# Patient Record
Sex: MALE | Race: BLACK OR AFRICAN AMERICAN | NOT HISPANIC OR LATINO | Employment: OTHER | URBAN - METROPOLITAN AREA
[De-identification: names, ages, dates, MRNs, and addresses within clinical notes are randomized per-mention and may not be internally consistent; named-entity substitution may affect disease eponyms.]

---

## 2023-01-25 ENCOUNTER — HOSPITAL ENCOUNTER (OUTPATIENT)
Dept: CARDIOLOGY | Facility: HOSPITAL | Age: 68
Discharge: HOME | End: 2023-01-25
Attending: FAMILY MEDICINE
Payer: MEDICARE

## 2023-01-25 ENCOUNTER — OFFICE VISIT (OUTPATIENT)
Dept: INTERNAL MEDICINE | Facility: CLINIC | Age: 68
End: 2023-01-25
Payer: MEDICARE

## 2023-01-25 ENCOUNTER — HOSPITAL ENCOUNTER (OUTPATIENT)
Dept: RADIOLOGY | Facility: HOSPITAL | Age: 68
Discharge: HOME | End: 2023-01-25
Attending: FAMILY MEDICINE
Payer: MEDICARE

## 2023-01-25 VITALS
BODY MASS INDEX: 23.52 KG/M2 | OXYGEN SATURATION: 98 % | DIASTOLIC BLOOD PRESSURE: 98 MMHG | WEIGHT: 141.13 LBS | HEIGHT: 65 IN | TEMPERATURE: 98 F | SYSTOLIC BLOOD PRESSURE: 204 MMHG | HEART RATE: 60 BPM

## 2023-01-25 DIAGNOSIS — Z01.00 ROUTINE EYE EXAM: ICD-10-CM

## 2023-01-25 DIAGNOSIS — Z12.2 ENCOUNTER FOR SCREENING FOR LUNG CANCER: ICD-10-CM

## 2023-01-25 DIAGNOSIS — I10 PRIMARY HYPERTENSION: Chronic | ICD-10-CM

## 2023-01-25 DIAGNOSIS — Z86.39 HISTORY OF METABOLIC AND NUTRITIONAL DISORDER: ICD-10-CM

## 2023-01-25 DIAGNOSIS — Z13.220 ENCOUNTER FOR LIPID SCREENING FOR CARDIOVASCULAR DISEASE: ICD-10-CM

## 2023-01-25 DIAGNOSIS — Z13.1 SCREENING FOR DIABETES MELLITUS: ICD-10-CM

## 2023-01-25 DIAGNOSIS — Z13.6 ENCOUNTER FOR LIPID SCREENING FOR CARDIOVASCULAR DISEASE: ICD-10-CM

## 2023-01-25 DIAGNOSIS — H91.13 PRESBYCUSIS OF BOTH EARS: ICD-10-CM

## 2023-01-25 DIAGNOSIS — Z87.891 FORMER SMOKER: ICD-10-CM

## 2023-01-25 DIAGNOSIS — H61.23 EXCESSIVE CERUMEN IN EAR CANAL, BILATERAL: ICD-10-CM

## 2023-01-25 DIAGNOSIS — I10 PRIMARY HYPERTENSION: Primary | Chronic | ICD-10-CM

## 2023-01-25 DIAGNOSIS — Z12.5 SCREENING PSA (PROSTATE SPECIFIC ANTIGEN): ICD-10-CM

## 2023-01-25 DIAGNOSIS — Z11.59 ENCOUNTER FOR HEPATITIS C SCREENING TEST FOR LOW RISK PATIENT: ICD-10-CM

## 2023-01-25 DIAGNOSIS — Z13.6 SCREENING FOR ABDOMINAL AORTIC ANEURYSM: ICD-10-CM

## 2023-01-25 PROCEDURE — 3008F BODY MASS INDEX DOCD: CPT | Mod: CPTII,S$GLB,, | Performed by: FAMILY MEDICINE

## 2023-01-25 PROCEDURE — 71045 XR CHEST 1 VIEW: ICD-10-PCS | Mod: 26,,, | Performed by: RADIOLOGY

## 2023-01-25 PROCEDURE — 3044F PR MOST RECENT HEMOGLOBIN A1C LEVEL <7.0%: ICD-10-PCS | Mod: CPTII,S$GLB,, | Performed by: FAMILY MEDICINE

## 2023-01-25 PROCEDURE — 1126F AMNT PAIN NOTED NONE PRSNT: CPT | Mod: CPTII,S$GLB,, | Performed by: FAMILY MEDICINE

## 2023-01-25 PROCEDURE — 3080F DIAST BP >= 90 MM HG: CPT | Mod: CPTII,S$GLB,, | Performed by: FAMILY MEDICINE

## 2023-01-25 PROCEDURE — 3077F SYST BP >= 140 MM HG: CPT | Mod: CPTII,S$GLB,, | Performed by: FAMILY MEDICINE

## 2023-01-25 PROCEDURE — 1101F PR PT FALLS ASSESS DOC 0-1 FALLS W/OUT INJ PAST YR: ICD-10-PCS | Mod: CPTII,S$GLB,, | Performed by: FAMILY MEDICINE

## 2023-01-25 PROCEDURE — 3080F PR MOST RECENT DIASTOLIC BLOOD PRESSURE >= 90 MM HG: ICD-10-PCS | Mod: CPTII,S$GLB,, | Performed by: FAMILY MEDICINE

## 2023-01-25 PROCEDURE — 3288F FALL RISK ASSESSMENT DOCD: CPT | Mod: CPTII,S$GLB,, | Performed by: FAMILY MEDICINE

## 2023-01-25 PROCEDURE — 1126F PR PAIN SEVERITY QUANTIFIED, NO PAIN PRESENT: ICD-10-PCS | Mod: CPTII,S$GLB,, | Performed by: FAMILY MEDICINE

## 2023-01-25 PROCEDURE — 3288F PR FALLS RISK ASSESSMENT DOCUMENTED: ICD-10-PCS | Mod: CPTII,S$GLB,, | Performed by: FAMILY MEDICINE

## 2023-01-25 PROCEDURE — 93010 EKG 12-LEAD: ICD-10-PCS | Mod: ,,, | Performed by: INTERNAL MEDICINE

## 2023-01-25 PROCEDURE — 71045 X-RAY EXAM CHEST 1 VIEW: CPT | Mod: TC

## 2023-01-25 PROCEDURE — 3044F HG A1C LEVEL LT 7.0%: CPT | Mod: CPTII,S$GLB,, | Performed by: FAMILY MEDICINE

## 2023-01-25 PROCEDURE — 99204 PR OFFICE/OUTPT VISIT, NEW, LEVL IV, 45-59 MIN: ICD-10-PCS | Mod: S$GLB,,, | Performed by: FAMILY MEDICINE

## 2023-01-25 PROCEDURE — 99999 PR PBB SHADOW E&M-NEW PATIENT-LVL V: CPT | Mod: PBBFAC,,, | Performed by: FAMILY MEDICINE

## 2023-01-25 PROCEDURE — 71045 X-RAY EXAM CHEST 1 VIEW: CPT | Mod: 26,,, | Performed by: RADIOLOGY

## 2023-01-25 PROCEDURE — 93010 ELECTROCARDIOGRAM REPORT: CPT | Mod: ,,, | Performed by: INTERNAL MEDICINE

## 2023-01-25 PROCEDURE — 93005 ELECTROCARDIOGRAM TRACING: CPT

## 2023-01-25 PROCEDURE — 3008F PR BODY MASS INDEX (BMI) DOCUMENTED: ICD-10-PCS | Mod: CPTII,S$GLB,, | Performed by: FAMILY MEDICINE

## 2023-01-25 PROCEDURE — 3077F PR MOST RECENT SYSTOLIC BLOOD PRESSURE >= 140 MM HG: ICD-10-PCS | Mod: CPTII,S$GLB,, | Performed by: FAMILY MEDICINE

## 2023-01-25 PROCEDURE — 99204 OFFICE O/P NEW MOD 45 MIN: CPT | Mod: S$GLB,,, | Performed by: FAMILY MEDICINE

## 2023-01-25 PROCEDURE — 99999 PR PBB SHADOW E&M-NEW PATIENT-LVL V: ICD-10-PCS | Mod: PBBFAC,,, | Performed by: FAMILY MEDICINE

## 2023-01-25 PROCEDURE — 1101F PT FALLS ASSESS-DOCD LE1/YR: CPT | Mod: CPTII,S$GLB,, | Performed by: FAMILY MEDICINE

## 2023-01-25 RX ORDER — VALSARTAN AND HYDROCHLOROTHIAZIDE 160; 12.5 MG/1; MG/1
1 TABLET, FILM COATED ORAL DAILY
Qty: 90 TABLET | Refills: 3 | Status: SHIPPED | OUTPATIENT
Start: 2023-01-25 | End: 2023-01-25 | Stop reason: SDUPTHER

## 2023-01-25 RX ORDER — VALSARTAN AND HYDROCHLOROTHIAZIDE 160; 12.5 MG/1; MG/1
1 TABLET, FILM COATED ORAL DAILY
Qty: 30 TABLET | Refills: 0 | Status: SHIPPED | OUTPATIENT
Start: 2023-01-25 | End: 2023-02-01 | Stop reason: DRUGHIGH

## 2023-01-25 NOTE — PROGRESS NOTES
OFFICE VISIT 1/25/23 11:00 AM CST    Subjective   CHIEF COMPLAINT: Establish Care    Hypertension is uncontrolled, but asymptomatic.      Review of Systems   Respiratory:  Negative for chest tightness and shortness of breath.    Cardiovascular:  Negative for chest pain.   Endocrine: Negative for polydipsia and polyuria.      Assessment & Plan   1. Primary hypertension  -     Comprehensive Metabolic Panel; Future; Expected date: 01/25/2023  -     Lipid Panel; Future; Expected date: 01/25/2023  -     US Abdominal Aorta; Future; Expected date: 01/25/2023  -     TSH; Future; Expected date: 01/25/2023  -     T4, Free; Future; Expected date: 01/25/2023  -     SCHEDULED EKG 12-LEAD (to Muse); Future  -     X-Ray Chest 1 View; Future; Expected date: 01/25/2023  -     Ambulatory referral/consult to Cardiology; Future; Expected date: 02/01/2023  -     Aldosterone/Renin Activity Ratio; Future; Expected date: 01/25/2023  -     Urinalysis; Future; Expected date: 01/25/2023  -     Rx: valsartan-hydrochlorothiazide (DIOVAN-HCT) 160-12.5 mg per tablet; Take 1 tablet by mouth once daily.  Dispense: 30 tablet; Refill: 0    2. Former smoker  Overview:  Quit 2021. 55 pack-year smoking history.    Orders:  -     CT Chest Lung Screening Low Dose; Future; Expected date: 01/25/2023  -     US Abdominal Aorta; Future; Expected date: 01/25/2023    3. Encounter for lipid screening for cardiovascular disease  -     Lipid Panel; Future; Expected date: 01/25/2023    4. Screening for diabetes mellitus  -     Hemoglobin A1C; Future; Expected date: 01/25/2023    5. Encounter for hepatitis C screening test for low risk patient  -     Hepatitis C Antibody; Future; Expected date: 01/25/2023    6. Screening PSA (prostate specific antigen)  -     PSA, Screening; Future; Expected date: 01/25/2023    7. Encounter for screening for lung cancer  -     CT Chest Lung Screening Low Dose; Future; Expected date: 01/25/2023    8. Excessive cerumen in ear canal,  "bilateral  -     Ambulatory referral/consult to ENT; Future; Expected date: 02/01/2023  -     Ambulatory referral/consult to Audiology; Future; Expected date: 02/01/2023    9. Presbycusis of both ears  -     Ambulatory referral/consult to ENT; Future; Expected date: 02/01/2023  -     Ambulatory referral/consult to Audiology; Future; Expected date: 02/01/2023    10. Routine eye exam  -     Ambulatory referral/consult to Ophthalmology; Future; Expected date: 02/01/2023    11. Screening for abdominal aortic aneurysm  -     US Abdominal Aorta; Future; Expected date: 01/25/2023    12. History of metabolic and nutritional disorder  -     Hemoglobin A1C; Future; Expected date: 01/25/2023  -     CBC Without Differential; Future; Expected date: 01/25/2023  -     Comprehensive Metabolic Panel; Future; Expected date: 01/25/2023    Unless noted herein, any chronic conditions are represented as and appear stable, and no other significant complaints or concerns were reported.    Follow up in about 1 week (around 2/1/2023) for periodic management of chronic medical conditions, re-evaluate problem(s) discussed today.     Objective   Vitals:    01/25/23 1036 01/25/23 1110 01/25/23 1111   BP: (!) 210/100 (!) 206/92 (!) 204/98   BP Location: Right arm Right arm Left arm   Patient Position: Sitting Sitting Sitting   BP Method: Large (Manual)     Pulse: 60     Temp: 97.7 °F (36.5 °C)     TempSrc: Tympanic     SpO2: 98%     Weight: 64 kg (141 lb 1.5 oz)     Height: 5' 5" (1.651 m)     Physical Exam  Vitals reviewed.   Constitutional:       General: He is not in acute distress.     Appearance: Normal appearance. He is not ill-appearing or diaphoretic.   Neck:      Vascular: No carotid bruit.   Cardiovascular:      Rate and Rhythm: Normal rate and regular rhythm.   Pulmonary:      Effort: Pulmonary effort is normal.      Breath sounds: Normal breath sounds.   Skin:     General: Skin is warm and dry.   Neurological:      Mental Status: He " "is alert and oriented to person, place, and time. Mental status is at baseline.   Psychiatric:         Mood and Affect: Mood normal.         Behavior: Behavior normal.         Judgment: Judgment normal.     Documentation entered by me for this encounter may have been done in part using speech-recognition technology. Although I have made an effort to ensure accuracy, "sound like" errors may exist and should be interpreted in context.    CHECK-OUT INSTRUCTIONS  Send today for labs, CXR, and EKG.  Schedule Cardiology appointment soon.  Schedule Ultrasound and CT scan within 2 months.  Schedule other appointments (ENT, ophthalmology, audiology) at his convenience.  Schedule appointment with Marcio Mcgrath NP or Damari Roca PA-C on Wednesday, February 1. (Meme Martin MA will be her that day to enroll her in digital medicine.)  Schedule next regularly available in-office appointment with me.  Send him to [facility] to  his prescription.    ITEMS REQUIRING FOLLOW-UP AT NEXT APPOINTMENT [facility]  Address lab and test results.  Adjust BP med PRN. Give him only 30-day supply.  Enroll in Hypertension Digital Medicine program.   I anticipate he will need to be started on a statin.  Ensure cardiology appointment scheduled.  Schedule a F/U appointment in 2 weeks to re-assess BP and verify participation in Hypertension Digital Medicine program.  "

## 2023-01-26 ENCOUNTER — TELEPHONE (OUTPATIENT)
Dept: OTOLARYNGOLOGY | Facility: CLINIC | Age: 68
End: 2023-01-26
Payer: MEDICARE

## 2023-02-01 ENCOUNTER — CLINICAL SUPPORT (OUTPATIENT)
Dept: AUDIOLOGY | Facility: CLINIC | Age: 68
End: 2023-02-01
Payer: MEDICARE

## 2023-02-01 ENCOUNTER — OFFICE VISIT (OUTPATIENT)
Dept: INTERNAL MEDICINE | Facility: CLINIC | Age: 68
End: 2023-02-01
Payer: MEDICARE

## 2023-02-01 ENCOUNTER — OFFICE VISIT (OUTPATIENT)
Dept: OTOLARYNGOLOGY | Facility: CLINIC | Age: 68
End: 2023-02-01
Payer: MEDICARE

## 2023-02-01 VITALS
TEMPERATURE: 98 F | DIASTOLIC BLOOD PRESSURE: 90 MMHG | BODY MASS INDEX: 23.44 KG/M2 | WEIGHT: 140.88 LBS | OXYGEN SATURATION: 96 % | HEART RATE: 70 BPM | SYSTOLIC BLOOD PRESSURE: 170 MMHG

## 2023-02-01 VITALS
SYSTOLIC BLOOD PRESSURE: 184 MMHG | DIASTOLIC BLOOD PRESSURE: 89 MMHG | WEIGHT: 140.44 LBS | TEMPERATURE: 98 F | BODY MASS INDEX: 23.37 KG/M2 | HEART RATE: 65 BPM

## 2023-02-01 DIAGNOSIS — H90.A32 MIXED CONDUCTIVE AND SENSORINEURAL HEARING LOSS OF LEFT EAR WITH RESTRICTED HEARING OF RIGHT EAR: ICD-10-CM

## 2023-02-01 DIAGNOSIS — Z85.46 HISTORY OF PROSTATE CANCER: ICD-10-CM

## 2023-02-01 DIAGNOSIS — I10 PRIMARY HYPERTENSION: ICD-10-CM

## 2023-02-01 DIAGNOSIS — H90.A21 SENSORINEURAL HEARING LOSS (SNHL) OF RIGHT EAR WITH RESTRICTED HEARING OF LEFT EAR: Primary | ICD-10-CM

## 2023-02-01 DIAGNOSIS — H61.21 RIGHT EAR IMPACTED CERUMEN: Primary | ICD-10-CM

## 2023-02-01 DIAGNOSIS — H61.23 EXCESSIVE CERUMEN IN EAR CANAL, BILATERAL: ICD-10-CM

## 2023-02-01 DIAGNOSIS — E78.2 MIXED HYPERLIPIDEMIA: Primary | ICD-10-CM

## 2023-02-01 PROCEDURE — 3288F FALL RISK ASSESSMENT DOCD: CPT | Mod: CPTII,S$GLB,, | Performed by: PHYSICIAN ASSISTANT

## 2023-02-01 PROCEDURE — 3008F BODY MASS INDEX DOCD: CPT | Mod: CPTII,S$GLB,, | Performed by: OTOLARYNGOLOGY

## 2023-02-01 PROCEDURE — 3044F PR MOST RECENT HEMOGLOBIN A1C LEVEL <7.0%: ICD-10-PCS | Mod: CPTII,S$GLB,, | Performed by: PHYSICIAN ASSISTANT

## 2023-02-01 PROCEDURE — 1159F PR MEDICATION LIST DOCUMENTED IN MEDICAL RECORD: ICD-10-PCS | Mod: CPTII,S$GLB,, | Performed by: OTOLARYNGOLOGY

## 2023-02-01 PROCEDURE — 3077F SYST BP >= 140 MM HG: CPT | Mod: CPTII,S$GLB,, | Performed by: PHYSICIAN ASSISTANT

## 2023-02-01 PROCEDURE — 99214 OFFICE O/P EST MOD 30 MIN: CPT | Mod: S$GLB,,, | Performed by: PHYSICIAN ASSISTANT

## 2023-02-01 PROCEDURE — 3044F HG A1C LEVEL LT 7.0%: CPT | Mod: CPTII,S$GLB,, | Performed by: PHYSICIAN ASSISTANT

## 2023-02-01 PROCEDURE — 3044F PR MOST RECENT HEMOGLOBIN A1C LEVEL <7.0%: ICD-10-PCS | Mod: CPTII,S$GLB,, | Performed by: OTOLARYNGOLOGY

## 2023-02-01 PROCEDURE — 1101F PT FALLS ASSESS-DOCD LE1/YR: CPT | Mod: CPTII,S$GLB,, | Performed by: OTOLARYNGOLOGY

## 2023-02-01 PROCEDURE — 3077F PR MOST RECENT SYSTOLIC BLOOD PRESSURE >= 140 MM HG: ICD-10-PCS | Mod: CPTII,S$GLB,, | Performed by: PHYSICIAN ASSISTANT

## 2023-02-01 PROCEDURE — 99499 NO LOS: ICD-10-PCS | Mod: S$GLB,,, | Performed by: OTOLARYNGOLOGY

## 2023-02-01 PROCEDURE — 3008F PR BODY MASS INDEX (BMI) DOCUMENTED: ICD-10-PCS | Mod: CPTII,S$GLB,, | Performed by: OTOLARYNGOLOGY

## 2023-02-01 PROCEDURE — 3288F PR FALLS RISK ASSESSMENT DOCUMENTED: ICD-10-PCS | Mod: CPTII,S$GLB,, | Performed by: OTOLARYNGOLOGY

## 2023-02-01 PROCEDURE — 3288F PR FALLS RISK ASSESSMENT DOCUMENTED: ICD-10-PCS | Mod: CPTII,S$GLB,, | Performed by: PHYSICIAN ASSISTANT

## 2023-02-01 PROCEDURE — 3079F PR MOST RECENT DIASTOLIC BLOOD PRESSURE 80-89 MM HG: ICD-10-PCS | Mod: CPTII,S$GLB,, | Performed by: OTOLARYNGOLOGY

## 2023-02-01 PROCEDURE — 1160F RVW MEDS BY RX/DR IN RCRD: CPT | Mod: CPTII,S$GLB,, | Performed by: PHYSICIAN ASSISTANT

## 2023-02-01 PROCEDURE — 99999 PR PBB SHADOW E&M-EST. PATIENT-LVL III: ICD-10-PCS | Mod: PBBFAC,,, | Performed by: PHYSICIAN ASSISTANT

## 2023-02-01 PROCEDURE — 3288F FALL RISK ASSESSMENT DOCD: CPT | Mod: CPTII,S$GLB,, | Performed by: OTOLARYNGOLOGY

## 2023-02-01 PROCEDURE — 3077F SYST BP >= 140 MM HG: CPT | Mod: CPTII,S$GLB,, | Performed by: OTOLARYNGOLOGY

## 2023-02-01 PROCEDURE — 1160F PR REVIEW ALL MEDS BY PRESCRIBER/CLIN PHARMACIST DOCUMENTED: ICD-10-PCS | Mod: CPTII,S$GLB,, | Performed by: PHYSICIAN ASSISTANT

## 2023-02-01 PROCEDURE — 1159F MED LIST DOCD IN RCRD: CPT | Mod: CPTII,S$GLB,, | Performed by: OTOLARYNGOLOGY

## 2023-02-01 PROCEDURE — 1101F PT FALLS ASSESS-DOCD LE1/YR: CPT | Mod: CPTII,S$GLB,, | Performed by: PHYSICIAN ASSISTANT

## 2023-02-01 PROCEDURE — 1126F PR PAIN SEVERITY QUANTIFIED, NO PAIN PRESENT: ICD-10-PCS | Mod: CPTII,S$GLB,, | Performed by: OTOLARYNGOLOGY

## 2023-02-01 PROCEDURE — 69210 PR REMOVAL IMPACTED CERUMEN REQUIRING INSTRUMENTATION, UNILATERAL: ICD-10-PCS | Mod: S$GLB,,, | Performed by: OTOLARYNGOLOGY

## 2023-02-01 PROCEDURE — 99999 PR PBB SHADOW E&M-EST. PATIENT-LVL III: ICD-10-PCS | Mod: PBBFAC,,, | Performed by: OTOLARYNGOLOGY

## 2023-02-01 PROCEDURE — 99999 PR PBB SHADOW E&M-EST. PATIENT-LVL II: ICD-10-PCS | Mod: PBBFAC,,, | Performed by: AUDIOLOGIST-HEARING AID FITTER

## 2023-02-01 PROCEDURE — 3080F DIAST BP >= 90 MM HG: CPT | Mod: CPTII,S$GLB,, | Performed by: PHYSICIAN ASSISTANT

## 2023-02-01 PROCEDURE — 69210 REMOVE IMPACTED EAR WAX UNI: CPT | Mod: S$GLB,,, | Performed by: OTOLARYNGOLOGY

## 2023-02-01 PROCEDURE — 1126F AMNT PAIN NOTED NONE PRSNT: CPT | Mod: CPTII,S$GLB,, | Performed by: PHYSICIAN ASSISTANT

## 2023-02-01 PROCEDURE — 3044F HG A1C LEVEL LT 7.0%: CPT | Mod: CPTII,S$GLB,, | Performed by: OTOLARYNGOLOGY

## 2023-02-01 PROCEDURE — 92557 PR COMPREHENSIVE HEARING TEST: ICD-10-PCS | Mod: S$GLB,,, | Performed by: AUDIOLOGIST-HEARING AID FITTER

## 2023-02-01 PROCEDURE — 99499 UNLISTED E&M SERVICE: CPT | Mod: S$GLB,,, | Performed by: OTOLARYNGOLOGY

## 2023-02-01 PROCEDURE — 3080F PR MOST RECENT DIASTOLIC BLOOD PRESSURE >= 90 MM HG: ICD-10-PCS | Mod: CPTII,S$GLB,, | Performed by: PHYSICIAN ASSISTANT

## 2023-02-01 PROCEDURE — 1159F PR MEDICATION LIST DOCUMENTED IN MEDICAL RECORD: ICD-10-PCS | Mod: CPTII,S$GLB,, | Performed by: PHYSICIAN ASSISTANT

## 2023-02-01 PROCEDURE — 1101F PR PT FALLS ASSESS DOC 0-1 FALLS W/OUT INJ PAST YR: ICD-10-PCS | Mod: CPTII,S$GLB,, | Performed by: PHYSICIAN ASSISTANT

## 2023-02-01 PROCEDURE — 3008F PR BODY MASS INDEX (BMI) DOCUMENTED: ICD-10-PCS | Mod: CPTII,S$GLB,, | Performed by: PHYSICIAN ASSISTANT

## 2023-02-01 PROCEDURE — 3079F DIAST BP 80-89 MM HG: CPT | Mod: CPTII,S$GLB,, | Performed by: OTOLARYNGOLOGY

## 2023-02-01 PROCEDURE — 99999 PR PBB SHADOW E&M-EST. PATIENT-LVL III: CPT | Mod: PBBFAC,,, | Performed by: OTOLARYNGOLOGY

## 2023-02-01 PROCEDURE — 99214 PR OFFICE/OUTPT VISIT, EST, LEVL IV, 30-39 MIN: ICD-10-PCS | Mod: S$GLB,,, | Performed by: PHYSICIAN ASSISTANT

## 2023-02-01 PROCEDURE — 1101F PR PT FALLS ASSESS DOC 0-1 FALLS W/OUT INJ PAST YR: ICD-10-PCS | Mod: CPTII,S$GLB,, | Performed by: OTOLARYNGOLOGY

## 2023-02-01 PROCEDURE — 99999 PR PBB SHADOW E&M-EST. PATIENT-LVL II: CPT | Mod: PBBFAC,,, | Performed by: AUDIOLOGIST-HEARING AID FITTER

## 2023-02-01 PROCEDURE — 3077F PR MOST RECENT SYSTOLIC BLOOD PRESSURE >= 140 MM HG: ICD-10-PCS | Mod: CPTII,S$GLB,, | Performed by: OTOLARYNGOLOGY

## 2023-02-01 PROCEDURE — 3008F BODY MASS INDEX DOCD: CPT | Mod: CPTII,S$GLB,, | Performed by: PHYSICIAN ASSISTANT

## 2023-02-01 PROCEDURE — 99999 PR PBB SHADOW E&M-EST. PATIENT-LVL III: CPT | Mod: PBBFAC,,, | Performed by: PHYSICIAN ASSISTANT

## 2023-02-01 PROCEDURE — 1159F MED LIST DOCD IN RCRD: CPT | Mod: CPTII,S$GLB,, | Performed by: PHYSICIAN ASSISTANT

## 2023-02-01 PROCEDURE — 92557 COMPREHENSIVE HEARING TEST: CPT | Mod: S$GLB,,, | Performed by: AUDIOLOGIST-HEARING AID FITTER

## 2023-02-01 PROCEDURE — 1126F AMNT PAIN NOTED NONE PRSNT: CPT | Mod: CPTII,S$GLB,, | Performed by: OTOLARYNGOLOGY

## 2023-02-01 PROCEDURE — 1126F PR PAIN SEVERITY QUANTIFIED, NO PAIN PRESENT: ICD-10-PCS | Mod: CPTII,S$GLB,, | Performed by: PHYSICIAN ASSISTANT

## 2023-02-01 RX ORDER — VALSARTAN AND HYDROCHLOROTHIAZIDE 320; 25 MG/1; MG/1
1 TABLET, FILM COATED ORAL DAILY
Qty: 90 TABLET | Refills: 3 | Status: SHIPPED | OUTPATIENT
Start: 2023-02-01 | End: 2023-03-08 | Stop reason: SDUPTHER

## 2023-02-01 RX ORDER — ATORVASTATIN CALCIUM 80 MG/1
80 TABLET, FILM COATED ORAL DAILY
Qty: 90 TABLET | Refills: 3 | Status: SHIPPED | OUTPATIENT
Start: 2023-02-01 | End: 2024-02-14 | Stop reason: SDUPTHER

## 2023-02-01 NOTE — PROGRESS NOTES
REFERRING PROVIDER  Aaareferral Self  No address on file  Subjective:   Patient: Musa Campos [id number], :1955   Visit date:2023 1:30 PM    Chief Complaint:  ear cleaning (Pt states decreased hearing left ear. Pt states every time it gets cold he cannot hear out of left ear)        HPI:     Musa Campos is a 67 y.o. male whom I am asked to see for evaluation of otalgia or hearing loss in the right ear for the past 1-4 weeks.   Musa rates the severity as moderate.  No exacerbating or relieving factors.  There is no drainage from the ears.      His meds, allergies, medical, surgical, social & family histories were reviewed & updated:  -     He has a current medication list which includes the following prescription(s): atorvastatin and valsartan-hydrochlorothiazide.  -     He  has a past medical history of Primary hypertension (2023).   -     He does not have any pertinent problems on file.   -     He  has a past surgical history that includes Prostate surgery.  -     He  reports that he quit smoking about 2 years ago. His smoking use included cigarettes. He started smoking about 57 years ago. He has a 55.00 pack-year smoking history. He does not have any smokeless tobacco history on file. He reports that he does not currently use alcohol. He reports current drug use. Drug: Marijuana.  -     His family history includes No Known Problems in his father and mother.  -     He has No Known Allergies.      Review of Systems:  -     Allergic/Immunologic: has No Known Allergies..  -     Constitutional: Current temp: 97.9 °F (36.6 °C) (Temporal)        Objective:     Physical Exam:  Vitals:  BP (!) 184/89   Pulse 65   Temp 97.9 °F (36.6 °C) (Temporal)   Wt 63.7 kg (140 lb 6.9 oz)   BMI 23.37 kg/m²   Communication:  Able to communicate, no hoarseness.  Head & Face:  Normocephalic, atraumatic, no sinus tenderness.  Eyes:  Extraocular motions intact.  Ears:  Otoscopy of external auditory canals  reveals impaction of right ear canals.  With the patient in the supine position, we used the operating microscope to examine both ears with the appropriate sized ear speculum.  A variety of sterile, micro-instruments were utilized to remove the cerumen atraumatically from the impacted ear(s).   After removal, the ears were reexamined-  Right Ear:  No mass/lesion of auricle. The external auditory canals is without erythema or discharge. Pneumatic otoscopy of the tympanic membrane revealed no perforation and good mobility, with no fluid in middle ear. Clinical speech reception thresholds grossly normal.  Left Ear:  No mass/lesion of auricle. The external auditory canals is without erythema or discharge. Pneumatic otoscopy of the tympanic membrane revealed no perforation and good mobility, with no fluid in middle ear. Clinical speech reception thresholds grossly normal  Nose:  No masses/lesions of external nose, nasal mucosa, septum, and turbinates were within normal limits.  Mouth:  No mass/lesion of lips, teeth, gums, hard/soft palate, tongue, tonsils, or oropharynx.  Neck & Lymphatics:  No cervical lymphadenopathy, no neck mass/crepitus/ asymmetry, trachea is midline, no thyroid enlargement/tenderness/mass.  Neuro/Psych: Alert with normal mood and affect.   Respiration/Chest:  Symmetric expansion during respiration, normal respiratory effort.  Skin:  Warm and intact.    Assessment & Plan:       -     Cerumen Impaction - Musa has cerumen impaction.  We discussed preventative measures and treatment options.  Q-tips must be avoided, instead the ears can be cleaned with OTC ear rinses (or a mixture of alcohol & vinegar in equal parts).   For hard wax, Musa may place mineral oil/baby oil in the ear with a cotton ball at night and remove in the shower.  This will assist in softening the wax and allow it to drain out on its own. If the cerumen impacts the ear canal and causes hearing loss or infection he needs to  follow-up in the clinic for treatment and cleaning.               negative...

## 2023-02-01 NOTE — PROGRESS NOTES
Referring Provider:Dr. Tomi Vigil [facility] was seen 02/01/2023 for an audiological evaluation.  Patient complains of otalgia or hearing loss in the right ear for the past 1-4 weeks.   Musa rates the severity as moderate.  No exacerbating or relieving factors.  There is no drainage from the ears. He reports longstanding hearing loss of the left ear that is worse when it is cold outside.    Results reveal a mild-to-moderate sensorineural hearing loss 250-8000 Hz for the right ear, and a moderate-to-severe mixed hearing loss 250-8000 Hz for the left ear.   Speech Reception Thresholds were  40 dBHL for the right ear and 70 dBHL for the left ear.   Word recognition scores were excellent for the right ear and excellent for the left ear.   Tympanograms were unable to obtain, Ear hair for the right ear and unable to obtain, Ear hair for the left ear.    Patient was counseled on the above findings.    Recommendations:  1. ENT  2. Annual Audiograms  3. Hearing aids via insurance when motivated.

## 2023-02-03 ENCOUNTER — HOSPITAL ENCOUNTER (OUTPATIENT)
Dept: RADIOLOGY | Facility: HOSPITAL | Age: 68
Discharge: HOME | End: 2023-02-03
Attending: FAMILY MEDICINE
Payer: MEDICARE

## 2023-02-03 DIAGNOSIS — Z87.891 FORMER SMOKER: ICD-10-CM

## 2023-02-03 DIAGNOSIS — Z12.2 ENCOUNTER FOR SCREENING FOR LUNG CANCER: ICD-10-CM

## 2023-02-03 DIAGNOSIS — I10 PRIMARY HYPERTENSION: ICD-10-CM

## 2023-02-03 DIAGNOSIS — Z13.6 SCREENING FOR ABDOMINAL AORTIC ANEURYSM: ICD-10-CM

## 2023-02-03 PROCEDURE — 76775 US EXAM ABDO BACK WALL LIM: CPT | Mod: 26,,, | Performed by: RADIOLOGY

## 2023-02-03 PROCEDURE — 71271 CT THORAX LUNG CANCER SCR C-: CPT | Mod: 26,,, | Performed by: RADIOLOGY

## 2023-02-03 PROCEDURE — 76775 US ABDOMINAL AORTA: ICD-10-PCS | Mod: 26,,, | Performed by: RADIOLOGY

## 2023-02-03 PROCEDURE — 76775 US EXAM ABDO BACK WALL LIM: CPT | Mod: TC

## 2023-02-03 PROCEDURE — 71271 CT THORAX LUNG CANCER SCR C-: CPT | Mod: TC

## 2023-02-03 PROCEDURE — 71271 CT CHEST LUNG SCREENING LOW DOSE: ICD-10-PCS | Mod: 26,,, | Performed by: RADIOLOGY

## 2023-02-04 ENCOUNTER — PATIENT MESSAGE (OUTPATIENT)
Dept: INTERNAL MEDICINE | Facility: CLINIC | Age: 68
End: 2023-02-04
Payer: MEDICARE

## 2023-02-04 DIAGNOSIS — Z87.891 FORMER SMOKER: Primary | ICD-10-CM

## 2023-02-04 DIAGNOSIS — Z12.2 ENCOUNTER FOR SCREENING FOR LUNG CANCER: ICD-10-CM

## 2023-02-04 DIAGNOSIS — R91.8 ABNORMAL SCREENING COMPUTED TOMOGRAPHY (CT) OF LUNG: ICD-10-CM

## 2023-02-04 PROBLEM — R77.9 ELEVATED SERUM PROTEIN LEVEL: Status: ACTIVE | Noted: 2023-02-04

## 2023-02-04 PROBLEM — E78.2 MIXED HYPERLIPIDEMIA: Chronic | Status: ACTIVE | Noted: 2023-02-04

## 2023-02-04 PROBLEM — D64.9 MILD ANEMIA: Chronic | Status: ACTIVE | Noted: 2023-02-04

## 2023-02-04 NOTE — PROGRESS NOTES
Musa Merrill.    I am proud of you for getting your lung cancer screening done!    The radiologist interpreted your test result findings as LUNG-RADS 3 (PROBABLY BENIGN). The findings in this category have a very high chance (greater than 98%) of being benign (NOT cancer).    But since it's not proven to be benign, it's helpful to see if the area in question changes over time. You will need follow-up with a repeat lung CT scan in 6 months. This approach helps avoid unnecessary biopsies, but if the area does change over time, it still allows for early diagnosis.    I've ordered your repeat lung CT scan. Please take the time now to schedule your CT scan in early August and appointment with me a few days later to review the test results.    You can schedule your CT scan by calling our appointment desk at [phone]. You can schedule your appointment with me from your [facility] account or from the [email or web address] shay on your smartphone or by calling our appointment desk at [phone]. If you have any difficulty, send my staff a message, and they will be glad to help.     Thanks for letting me care for you, and thanks for trusting [facility] with your healthcare needs.    Sincerely,    THIERNO Krishna MD

## 2023-02-05 ENCOUNTER — PATIENT MESSAGE (OUTPATIENT)
Dept: ADMINISTRATIVE | Facility: HOSPITAL | Age: 68
End: 2023-02-05
Payer: MEDICARE

## 2023-02-08 ENCOUNTER — PROCEDURE VISIT (OUTPATIENT)
Dept: OTOLARYNGOLOGY | Facility: CLINIC | Age: 68
End: 2023-02-08
Payer: MEDICARE

## 2023-02-08 DIAGNOSIS — H65.32 CHRONIC MUCOID OTITIS MEDIA OF LEFT EAR: Primary | ICD-10-CM

## 2023-02-08 DIAGNOSIS — H69.92 DYSFUNCTION OF LEFT EUSTACHIAN TUBE: ICD-10-CM

## 2023-02-08 PROCEDURE — 31231 PR NASAL ENDOSCOPY, DX: ICD-10-PCS | Mod: 51,S$GLB,, | Performed by: OTOLARYNGOLOGY

## 2023-02-08 PROCEDURE — 69433 CREATE EARDRUM OPENING: CPT | Mod: LT,S$GLB,, | Performed by: OTOLARYNGOLOGY

## 2023-02-08 PROCEDURE — 31231 NASAL ENDOSCOPY DX: CPT | Mod: 51,S$GLB,, | Performed by: OTOLARYNGOLOGY

## 2023-02-08 PROCEDURE — 69433 PR CREATE EARDRUM OPENING,LOCAL ANESTH: ICD-10-PCS | Mod: LT,S$GLB,, | Performed by: OTOLARYNGOLOGY

## 2023-02-08 RX ORDER — OFLOXACIN 3 MG/ML
3 SOLUTION AURICULAR (OTIC) 2 TIMES DAILY
Qty: 5 ML | Refills: 0 | Status: SHIPPED | OUTPATIENT
Start: 2023-02-08 | End: 2023-02-13

## 2023-02-08 NOTE — PROCEDURES
Procedures    Preprocedure Diagnosis:  Chronic left otitis media with effusion, ETD  Postoprocedure Diagnosis:  Same  Procedure:  Left ear tube placement    Findings:  Left ear tympanic membrane serous effusion, right ear tympanic membrane  normal    Procedure in detail:  After appropriate consents were obtained, the patient was placed on the exam chair in a supine position.  The binocular operating microscope was then brought into the field.    His left EAC was found to have a small amount of cerumen that was carefully cleaned with a curette.  The tympanic membrane was then visualized, and was found to be serous effusion.  A small drop of phenol was then applied to the tympanic membrane in the area of anticipated myringotomy.   A radial myringotomy was then made in the anterior-inferior quadrant of the tympanic membrane, and a #5 Feliciano tip suction was used to clear the middle ear.  With an alligator forceps, an Villanueva beveled grommet tube was then placed into the myringotomy site without difficulty.     The patient tolerated the procedure without difficulty, and there were no complications.    PROCEDURE NOTE:  Diagnostic nasal endoscopy  Preprocedure diagnosis:  Unilateral middle ear effusion  Postprocedure diangosis:  Same  Complications:  None  Blood Loss:  None    Procedure in detail:  After verbal consent was obtained, the patient's nasal cavity was anesthesized using topical Tetracaine and Neosynepherine.  A rigid 30 degree endoscope was placed in first the right, then the left nasal cavity.  The inferior and middle turbinates were examined, and found to be normal bilaterally.  The middle meatus and maxillary antrum was also examined, and found to be normal bilaterally.  No purulent drainage or masses seen.  The patient tolerated the procedure well and there were no complications.       Media Information  File Link    Annotation on 2/1/2023  2:08 PM by TREVOR BarbaD: AUDIOGRAM        [facility]quez  Information    Document ID File Type Document Type Description   [id number] Annotation Annotation AUDIOGRAM     Import Information    Attached At Date Time User Dept   Encounter Level 2/1/2023  2:08 PM KRISTOPHER Barba [facility] Audiology     Encounter    Clinical Support on 2/1/23 with KRISTOPHER Barba

## 2023-03-08 ENCOUNTER — OFFICE VISIT (OUTPATIENT)
Dept: UROLOGY | Facility: CLINIC | Age: 68
End: 2023-03-08
Payer: MEDICARE

## 2023-03-08 ENCOUNTER — OFFICE VISIT (OUTPATIENT)
Dept: CARDIOLOGY | Facility: CLINIC | Age: 68
End: 2023-03-08
Payer: MEDICARE

## 2023-03-08 VITALS
TEMPERATURE: 98 F | DIASTOLIC BLOOD PRESSURE: 75 MMHG | SYSTOLIC BLOOD PRESSURE: 183 MMHG | WEIGHT: 145.31 LBS | HEART RATE: 59 BPM | HEIGHT: 65 IN | BODY MASS INDEX: 24.21 KG/M2

## 2023-03-08 VITALS
OXYGEN SATURATION: 95 % | HEART RATE: 57 BPM | SYSTOLIC BLOOD PRESSURE: 180 MMHG | WEIGHT: 145.06 LBS | BODY MASS INDEX: 24.14 KG/M2 | DIASTOLIC BLOOD PRESSURE: 74 MMHG

## 2023-03-08 DIAGNOSIS — R01.1 UNDIAGNOSED CARDIAC MURMURS: ICD-10-CM

## 2023-03-08 DIAGNOSIS — I10 PRIMARY HYPERTENSION: Primary | Chronic | ICD-10-CM

## 2023-03-08 DIAGNOSIS — E78.2 MIXED HYPERLIPIDEMIA: Chronic | ICD-10-CM

## 2023-03-08 DIAGNOSIS — D64.9 MILD ANEMIA: Chronic | ICD-10-CM

## 2023-03-08 DIAGNOSIS — Z85.46 HISTORY OF PROSTATE CANCER: ICD-10-CM

## 2023-03-08 DIAGNOSIS — J45.909 ASTHMA, UNSPECIFIED ASTHMA SEVERITY, UNSPECIFIED WHETHER COMPLICATED, UNSPECIFIED WHETHER PERSISTENT: ICD-10-CM

## 2023-03-08 DIAGNOSIS — Z87.891 FORMER SMOKER: ICD-10-CM

## 2023-03-08 PROCEDURE — 1159F MED LIST DOCD IN RCRD: CPT | Mod: CPTII,S$GLB,, | Performed by: INTERNAL MEDICINE

## 2023-03-08 PROCEDURE — 3077F PR MOST RECENT SYSTOLIC BLOOD PRESSURE >= 140 MM HG: ICD-10-PCS | Mod: CPTII,S$GLB,, | Performed by: INTERNAL MEDICINE

## 2023-03-08 PROCEDURE — 1159F MED LIST DOCD IN RCRD: CPT | Mod: CPTII,S$GLB,, | Performed by: UROLOGY

## 2023-03-08 PROCEDURE — 3078F DIAST BP <80 MM HG: CPT | Mod: CPTII,S$GLB,, | Performed by: INTERNAL MEDICINE

## 2023-03-08 PROCEDURE — 3008F PR BODY MASS INDEX (BMI) DOCUMENTED: ICD-10-PCS | Mod: CPTII,S$GLB,, | Performed by: INTERNAL MEDICINE

## 2023-03-08 PROCEDURE — 99999 PR PBB SHADOW E&M-EST. PATIENT-LVL IV: CPT | Mod: PBBFAC,,, | Performed by: INTERNAL MEDICINE

## 2023-03-08 PROCEDURE — 1126F AMNT PAIN NOTED NONE PRSNT: CPT | Mod: CPTII,S$GLB,, | Performed by: INTERNAL MEDICINE

## 2023-03-08 PROCEDURE — 99999 PR PBB SHADOW E&M-EST. PATIENT-LVL III: ICD-10-PCS | Mod: PBBFAC,,, | Performed by: UROLOGY

## 2023-03-08 PROCEDURE — 99204 OFFICE O/P NEW MOD 45 MIN: CPT | Mod: S$GLB,,, | Performed by: UROLOGY

## 2023-03-08 PROCEDURE — 1159F PR MEDICATION LIST DOCUMENTED IN MEDICAL RECORD: ICD-10-PCS | Mod: CPTII,S$GLB,, | Performed by: UROLOGY

## 2023-03-08 PROCEDURE — 3077F PR MOST RECENT SYSTOLIC BLOOD PRESSURE >= 140 MM HG: ICD-10-PCS | Mod: CPTII,S$GLB,, | Performed by: UROLOGY

## 2023-03-08 PROCEDURE — 3288F FALL RISK ASSESSMENT DOCD: CPT | Mod: CPTII,S$GLB,, | Performed by: UROLOGY

## 2023-03-08 PROCEDURE — 1101F PT FALLS ASSESS-DOCD LE1/YR: CPT | Mod: CPTII,S$GLB,, | Performed by: UROLOGY

## 2023-03-08 PROCEDURE — 99204 OFFICE O/P NEW MOD 45 MIN: CPT | Mod: S$GLB,,, | Performed by: INTERNAL MEDICINE

## 2023-03-08 PROCEDURE — 1159F PR MEDICATION LIST DOCUMENTED IN MEDICAL RECORD: ICD-10-PCS | Mod: CPTII,S$GLB,, | Performed by: INTERNAL MEDICINE

## 2023-03-08 PROCEDURE — 1160F PR REVIEW ALL MEDS BY PRESCRIBER/CLIN PHARMACIST DOCUMENTED: ICD-10-PCS | Mod: CPTII,S$GLB,, | Performed by: UROLOGY

## 2023-03-08 PROCEDURE — 3008F BODY MASS INDEX DOCD: CPT | Mod: CPTII,S$GLB,, | Performed by: UROLOGY

## 2023-03-08 PROCEDURE — 1101F PR PT FALLS ASSESS DOC 0-1 FALLS W/OUT INJ PAST YR: ICD-10-PCS | Mod: CPTII,S$GLB,, | Performed by: INTERNAL MEDICINE

## 2023-03-08 PROCEDURE — 1126F AMNT PAIN NOTED NONE PRSNT: CPT | Mod: CPTII,S$GLB,, | Performed by: UROLOGY

## 2023-03-08 PROCEDURE — 1101F PR PT FALLS ASSESS DOC 0-1 FALLS W/OUT INJ PAST YR: ICD-10-PCS | Mod: CPTII,S$GLB,, | Performed by: UROLOGY

## 2023-03-08 PROCEDURE — 3078F PR MOST RECENT DIASTOLIC BLOOD PRESSURE < 80 MM HG: ICD-10-PCS | Mod: CPTII,S$GLB,, | Performed by: INTERNAL MEDICINE

## 2023-03-08 PROCEDURE — 3077F SYST BP >= 140 MM HG: CPT | Mod: CPTII,S$GLB,, | Performed by: UROLOGY

## 2023-03-08 PROCEDURE — 3044F HG A1C LEVEL LT 7.0%: CPT | Mod: CPTII,S$GLB,, | Performed by: UROLOGY

## 2023-03-08 PROCEDURE — 3044F PR MOST RECENT HEMOGLOBIN A1C LEVEL <7.0%: ICD-10-PCS | Mod: CPTII,S$GLB,, | Performed by: INTERNAL MEDICINE

## 2023-03-08 PROCEDURE — 1126F PR PAIN SEVERITY QUANTIFIED, NO PAIN PRESENT: ICD-10-PCS | Mod: CPTII,S$GLB,, | Performed by: UROLOGY

## 2023-03-08 PROCEDURE — 99204 PR OFFICE/OUTPT VISIT, NEW, LEVL IV, 45-59 MIN: ICD-10-PCS | Mod: S$GLB,,, | Performed by: INTERNAL MEDICINE

## 2023-03-08 PROCEDURE — 3078F DIAST BP <80 MM HG: CPT | Mod: CPTII,S$GLB,, | Performed by: UROLOGY

## 2023-03-08 PROCEDURE — 1160F RVW MEDS BY RX/DR IN RCRD: CPT | Mod: CPTII,S$GLB,, | Performed by: INTERNAL MEDICINE

## 2023-03-08 PROCEDURE — 3288F FALL RISK ASSESSMENT DOCD: CPT | Mod: CPTII,S$GLB,, | Performed by: INTERNAL MEDICINE

## 2023-03-08 PROCEDURE — 99999 PR PBB SHADOW E&M-EST. PATIENT-LVL IV: ICD-10-PCS | Mod: PBBFAC,,, | Performed by: INTERNAL MEDICINE

## 2023-03-08 PROCEDURE — 99204 PR OFFICE/OUTPT VISIT, NEW, LEVL IV, 45-59 MIN: ICD-10-PCS | Mod: S$GLB,,, | Performed by: UROLOGY

## 2023-03-08 PROCEDURE — 1160F RVW MEDS BY RX/DR IN RCRD: CPT | Mod: CPTII,S$GLB,, | Performed by: UROLOGY

## 2023-03-08 PROCEDURE — 99999 PR PBB SHADOW E&M-EST. PATIENT-LVL III: CPT | Mod: PBBFAC,,, | Performed by: UROLOGY

## 2023-03-08 PROCEDURE — 1126F PR PAIN SEVERITY QUANTIFIED, NO PAIN PRESENT: ICD-10-PCS | Mod: CPTII,S$GLB,, | Performed by: INTERNAL MEDICINE

## 2023-03-08 PROCEDURE — 1160F PR REVIEW ALL MEDS BY PRESCRIBER/CLIN PHARMACIST DOCUMENTED: ICD-10-PCS | Mod: CPTII,S$GLB,, | Performed by: INTERNAL MEDICINE

## 2023-03-08 PROCEDURE — 3044F HG A1C LEVEL LT 7.0%: CPT | Mod: CPTII,S$GLB,, | Performed by: INTERNAL MEDICINE

## 2023-03-08 PROCEDURE — 3288F PR FALLS RISK ASSESSMENT DOCUMENTED: ICD-10-PCS | Mod: CPTII,S$GLB,, | Performed by: UROLOGY

## 2023-03-08 PROCEDURE — 3288F PR FALLS RISK ASSESSMENT DOCUMENTED: ICD-10-PCS | Mod: CPTII,S$GLB,, | Performed by: INTERNAL MEDICINE

## 2023-03-08 PROCEDURE — 3077F SYST BP >= 140 MM HG: CPT | Mod: CPTII,S$GLB,, | Performed by: INTERNAL MEDICINE

## 2023-03-08 PROCEDURE — 3008F PR BODY MASS INDEX (BMI) DOCUMENTED: ICD-10-PCS | Mod: CPTII,S$GLB,, | Performed by: UROLOGY

## 2023-03-08 PROCEDURE — 1101F PT FALLS ASSESS-DOCD LE1/YR: CPT | Mod: CPTII,S$GLB,, | Performed by: INTERNAL MEDICINE

## 2023-03-08 PROCEDURE — 3078F PR MOST RECENT DIASTOLIC BLOOD PRESSURE < 80 MM HG: ICD-10-PCS | Mod: CPTII,S$GLB,, | Performed by: UROLOGY

## 2023-03-08 PROCEDURE — 3044F PR MOST RECENT HEMOGLOBIN A1C LEVEL <7.0%: ICD-10-PCS | Mod: CPTII,S$GLB,, | Performed by: UROLOGY

## 2023-03-08 PROCEDURE — 3008F BODY MASS INDEX DOCD: CPT | Mod: CPTII,S$GLB,, | Performed by: INTERNAL MEDICINE

## 2023-03-08 RX ORDER — AMLODIPINE BESYLATE 5 MG/1
5 TABLET ORAL DAILY
Qty: 90 TABLET | Refills: 1 | Status: SHIPPED | OUTPATIENT
Start: 2023-03-08 | End: 2023-10-30 | Stop reason: SDUPTHER

## 2023-03-08 RX ORDER — VALSARTAN AND HYDROCHLOROTHIAZIDE 320; 25 MG/1; MG/1
1 TABLET, FILM COATED ORAL DAILY
Qty: 90 TABLET | Refills: 1 | Status: SHIPPED | OUTPATIENT
Start: 2023-03-08 | End: 2023-10-30 | Stop reason: SDUPTHER

## 2023-03-08 NOTE — PROGRESS NOTES
Subjective:   Patient ID:  Musa Campos is a 67 y.o. male who presents for cardiac consult of No chief complaint on file.    Referring Physician:   KONSTANTIN Krishna MD [[id number]] [id number] [facility] [place] [address]     Reason for consult: HTN      HPI  The patient came in today for cardiac consult of No chief complaint on file.    3/8/23  Musa Campos is a 67 y.o. male pt with HTN, HLD, anemia, former smoker, h/o prostate CA presents for CV eval of HTN.     Recent BP elevated 180s/70s. Today 180/74. HR 57. BMI 24 - 145 lbs. He ran out of BP meds 3 days ago, he did not realize meds were sent to [facility].     Patient feels  no chest pain, no sob, no leg swelling, no PND, no palpitation, no dizziness, no syncope, no CNS symptoms.    Patient has fairly good exercise tolerance.    Patient is compliant with medications.    Past Medical History:   Diagnosis Date    Primary hypertension 2023       Past Surgical History:   Procedure Laterality Date    PROSTATE SURGERY         Social History     Tobacco Use    Smoking status: Former     Packs/day: 1.00     Years: 55.00     Pack years: 55.00     Types: Cigarettes     Start date:      Quit date:      Years since quittin.1    Smokeless tobacco: Former    Tobacco comments:     Pt quit smoking about 2 years ago.   Substance Use Topics    Alcohol use: Not Currently    Drug use: Yes     Types: Marijuana       Family History   Problem Relation Age of Onset    No Known Problems Mother     No Known Problems Father        Patient's Medications   New Prescriptions    AMLODIPINE (NORVASC) 5 MG TABLET    Take 1 tablet (5 mg total) by mouth once daily.   Previous Medications    ATORVASTATIN (LIPITOR) 80 MG TABLET    Take 1 tablet (80 mg total) by mouth once daily.   Modified Medications    Modified Medication Previous Medication    VALSARTAN-HYDROCHLOROTHIAZIDE (DIOVAN-HCT) 320-25 MG PER TABLET valsartan-hydrochlorothiazide (DIOVAN-HCT) 320-25  mg per tablet       Take 1 tablet by mouth once daily.    Take 1 tablet by mouth once daily.   Discontinued Medications    No medications on file       Review of Systems   Constitutional: Negative.    HENT: Negative.     Eyes: Negative.    Respiratory: Negative.     Cardiovascular: Negative.    Gastrointestinal: Negative.    Genitourinary: Negative.    Musculoskeletal: Negative.    Skin: Negative.    Neurological: Negative.    Endo/Heme/Allergies: Negative.    Psychiatric/Behavioral: Negative.     All 12 systems otherwise negative.    Wt Readings from Last 3 Encounters:   03/08/23 65.8 kg (145 lb 1 oz)   03/08/23 65.9 kg (145 lb 4.5 oz)   02/01/23 63.7 kg (140 lb 6.9 oz)     Temp Readings from Last 3 Encounters:   03/08/23 98.2 °F (36.8 °C) (Oral)   02/01/23 97.9 °F (36.6 °C) (Temporal)   02/01/23 98.2 °F (36.8 °C) (Oral)     BP Readings from Last 3 Encounters:   03/08/23 (!) 180/74   03/08/23 (!) 183/75   02/01/23 (!) 184/89     Pulse Readings from Last 3 Encounters:   03/08/23 (!) 57   03/08/23 (!) 59   02/01/23 65       BP (!) 180/74   Pulse (!) 57   Wt 65.8 kg (145 lb 1 oz)   SpO2 95%   BMI 24.14 kg/m²     Objective:   Physical Exam  Vitals and nursing note reviewed.   Constitutional:       General: He is not in acute distress.     Appearance: He is well-developed. He is not diaphoretic.   HENT:      Head: Normocephalic and atraumatic.      Nose: Nose normal.   Eyes:      General: No scleral icterus.     Conjunctiva/sclera: Conjunctivae normal.   Neck:      Thyroid: No thyromegaly.      Vascular: No JVD.   Cardiovascular:      Rate and Rhythm: Normal rate and regular rhythm.      Heart sounds: S1 normal and S2 normal. Murmur heard.     No friction rub. No gallop. No S3 or S4 sounds.   Pulmonary:      Effort: Pulmonary effort is normal. No respiratory distress.      Breath sounds: No stridor. Wheezing present. No rales.   Chest:      Chest wall: No tenderness.   Abdominal:      General: Bowel sounds are  normal. There is no distension.      Palpations: Abdomen is soft. There is no mass.      Tenderness: There is no abdominal tenderness. There is no rebound.   Genitourinary:     Comments: Deferred  Musculoskeletal:         General: No tenderness or deformity. Normal range of motion.      Cervical back: Normal range of motion and neck supple.   Lymphadenopathy:      Cervical: No cervical adenopathy.   Skin:     General: Skin is warm and dry.      Coloration: Skin is not pale.      Findings: No erythema or rash.   Neurological:      Mental Status: He is alert and oriented to person, place, and time.      Motor: No abnormal muscle tone.      Coordination: Coordination normal.   Psychiatric:         Behavior: Behavior normal.         Thought Content: Thought content normal.         Judgment: Judgment normal.       Lab Results   Component Value Date     01/25/2023    K 4.9 01/25/2023     01/25/2023    CO2 29 01/25/2023    BUN 11 01/25/2023    CREATININE 0.8 01/25/2023    GLU 80 01/25/2023    HGBA1C 5.6 01/25/2023    AST 16 01/25/2023    ALT 17 01/25/2023    ALBUMIN 3.5 01/25/2023    PROT 8.8 (H) 01/25/2023    BILITOT 0.5 01/25/2023    WBC 7.66 01/25/2023    HGB 12.6 (L) 01/25/2023    HCT 42.2 01/25/2023    MCV 90 01/25/2023     01/25/2023    TSH 2.094 01/25/2023    CHOL 263 (H) 01/25/2023    HDL 55 01/25/2023    LDLCALC 166.0 (H) 01/25/2023    TRIG 210 (H) 01/25/2023     Assessment:      1. Primary hypertension    2. Mixed hyperlipidemia    3. History of prostate cancer    4. Mild anemia    5. Former smoker    6. Asthma, unspecified asthma severity, unspecified whether complicated, unspecified whether persistent    7. Undiagnosed cardiac murmurs        Plan:     HTN - elevated ; with murmur  - order ECHO  - titrate meds - restarted med  - add Norvasc 5mg    2. HLD  - cont statin  - add further meds if remains elevated     3. H/O Prostate CA  - f/u urology, will have repeat PSA  - had prior prostate  surg    4. Anemia  - cont to monitor    5. Former smoker, wheezing on exam  - refer to pulm  - cont cessation     Thank you for allowing me to participate in this patient's care. Please do not hesitate to contact me with any questions or concerns. Consult note has been forwarded to the referral physician.

## 2023-03-08 NOTE — PROGRESS NOTES
Chief Complaint:  History of prostate cancer    HPI:   Musa Campos is a very pleasant 67 y.o. male that presents today as a referral from PROSPER Roca for history of prostate cancer.  Patient notes that he had his prostate removed 5-6 years ago at [facility].  He is uncertain who his surgeon was.  He notes that afterwards he had a Ryder catheter and once the catheter was taken out he had urinary incontinence.  This resolved but he can not give me a time frame.  He notes that he saw a doctor after his prostate was removed every month or so for awhile but could not provide more history than that.  Unclear what his PSA was doing at that time, however recent PSA in January 0.72, which is high for somebody who has undergone prostatectomy.  He denies any bone pain, hip pain, back pain.  He denies any voiding issues, has good flow and feels like he empties well.  Denies any gross hematuria.  He does have a family history of prostate cancer in one of his cousins.  Has ED but is not bothered by this as he is not sexually active and does not have a partner.      PMH:  Past Medical History:   Diagnosis Date    Primary hypertension 2023       PSH:  Past Surgical History:   Procedure Laterality Date    PROSTATE SURGERY         Family History:  Family History   Problem Relation Age of Onset    No Known Problems Mother     No Known Problems Father        Social History:  Social History     Tobacco Use    Smoking status: Former     Packs/day: 1.00     Years: 55.00     Pack years: 55.00     Types: Cigarettes     Start date:      Quit date:      Years since quittin.1    Smokeless tobacco: Former    Tobacco comments:     Pt quit smoking about 2 years ago.   Substance Use Topics    Alcohol use: Not Currently    Drug use: Yes     Types: Marijuana        Review of Systems:  General: No fever, chills  Skin: No rashes  Chest:  Denies cough and sputum production  Heart: Denies chest pain  Resp: Denies  "dyspnea  Abdomen: Denies diarrhea, abdominal pain, hematemesis, or blood in stool.  Musculoskeletal: No joint stiffness or swelling. Denies back pain.  : see HPI  Neuro: no dizziness or weakness    Allergies:  Patient has no known allergies.    Medications:    Current Outpatient Medications:     atorvastatin (LIPITOR) 80 MG tablet, Take 1 tablet (80 mg total) by mouth once daily., Disp: 90 tablet, Rfl: 3    valsartan-hydrochlorothiazide (DIOVAN-HCT) 320-25 mg per tablet, Take 1 tablet by mouth once daily., Disp: 90 tablet, Rfl: 3    Physical Exam:  Vitals:    03/08/23 0851   BP: (!) 183/75   Pulse: (!) 59   Temp: 98.2 °F (36.8 °C)     Body mass index is 24.18 kg/m².  General: awake, alert, cooperative  Head: NC/AT  Ears: external ears normal  Eyes: sclera normal  Lungs: normal inspiration, NAD  Heart: well-perfused  Abdomen: Soft, NT, ND  /THU: refused, "maybe next time"  Skin: The skin is warm and dry  Ext: No c/c/e.  Neuro: grossly intact, AOx3    RADIOLOGY:  No recent relevant imaging available for review.    LABS:  I personally reviewed the following lab values:  Lab Results   Component Value Date    WBC 7.66 01/25/2023    HGB 12.6 (L) 01/25/2023    HCT 42.2 01/25/2023     01/25/2023     01/25/2023    K 4.9 01/25/2023     01/25/2023    CREATININE 0.8 01/25/2023    BUN 11 01/25/2023    CO2 29 01/25/2023    TSH 2.094 01/25/2023    PSA 0.72 01/25/2023    HGBA1C 5.6 01/25/2023    CHOL 263 (H) 01/25/2023    TRIG 210 (H) 01/25/2023    HDL 55 01/25/2023    ALT 17 01/25/2023    AST 16 01/25/2023       Assessment/Plan:   Musa Campos is a 67 y.o. male with:    History of prostate cancer - PSA 0.72 which fulfills criteria for biochemical recurrence.  Reviewed options including following PSA versus further imaging investigation.  Patient would prefer to trend his PSA.  I feel this is reasonable.  Will obtain records from [facility] regarding his operative report and pathology to " hopefully ascertain who performed his prostatectomy and then will get outpatient records after this.  PSA today, call with this result.    Thank you for allowing me the opportunity to participate in this patient's care.     Arvin Mills MD  Urology

## 2023-03-09 ENCOUNTER — PATIENT MESSAGE (OUTPATIENT)
Dept: PULMONOLOGY | Facility: CLINIC | Age: 68
End: 2023-03-09
Payer: MEDICARE

## 2023-03-20 ENCOUNTER — OFFICE VISIT (OUTPATIENT)
Dept: OPHTHALMOLOGY | Facility: CLINIC | Age: 68
End: 2023-03-20
Payer: MEDICARE

## 2023-03-20 DIAGNOSIS — H40.003 GLAUCOMA SUSPECT, BOTH EYES: ICD-10-CM

## 2023-03-20 DIAGNOSIS — H21.541 POSTERIOR SYNECHIAE (IRIS), RIGHT EYE: ICD-10-CM

## 2023-03-20 DIAGNOSIS — H25.813 COMBINED FORM OF AGE-RELATED CATARACT, BOTH EYES: Primary | ICD-10-CM

## 2023-03-20 DIAGNOSIS — Z01.00 ROUTINE EYE EXAM: ICD-10-CM

## 2023-03-20 DIAGNOSIS — H53.8 BLURRED VISION, RIGHT EYE: ICD-10-CM

## 2023-03-20 PROCEDURE — 99999 PR PBB SHADOW E&M-EST. PATIENT-LVL II: CPT | Mod: PBBFAC,,, | Performed by: OPTOMETRIST

## 2023-03-20 PROCEDURE — 99999 PR PBB SHADOW E&M-EST. PATIENT-LVL II: ICD-10-PCS | Mod: PBBFAC,,, | Performed by: OPTOMETRIST

## 2023-03-20 PROCEDURE — 1159F MED LIST DOCD IN RCRD: CPT | Mod: CPTII,S$GLB,, | Performed by: OPTOMETRIST

## 2023-03-20 PROCEDURE — 1126F AMNT PAIN NOTED NONE PRSNT: CPT | Mod: CPTII,S$GLB,, | Performed by: OPTOMETRIST

## 2023-03-20 PROCEDURE — 92004 PR EYE EXAM, NEW PATIENT,COMPREHESV: ICD-10-PCS | Mod: S$GLB,,, | Performed by: OPTOMETRIST

## 2023-03-20 PROCEDURE — 3044F PR MOST RECENT HEMOGLOBIN A1C LEVEL <7.0%: ICD-10-PCS | Mod: CPTII,S$GLB,, | Performed by: OPTOMETRIST

## 2023-03-20 PROCEDURE — 1159F PR MEDICATION LIST DOCUMENTED IN MEDICAL RECORD: ICD-10-PCS | Mod: CPTII,S$GLB,, | Performed by: OPTOMETRIST

## 2023-03-20 PROCEDURE — 3044F HG A1C LEVEL LT 7.0%: CPT | Mod: CPTII,S$GLB,, | Performed by: OPTOMETRIST

## 2023-03-20 PROCEDURE — 92004 COMPRE OPH EXAM NEW PT 1/>: CPT | Mod: S$GLB,,, | Performed by: OPTOMETRIST

## 2023-03-20 PROCEDURE — 1126F PR PAIN SEVERITY QUANTIFIED, NO PAIN PRESENT: ICD-10-PCS | Mod: CPTII,S$GLB,, | Performed by: OPTOMETRIST

## 2023-03-20 NOTE — PROGRESS NOTES
HPI     Annual Exam            Comments: Annual Exam. VA in right eye is blind and left eye is getting   blurrier. Not on any gtts.  Vision changes since last eye exam?: Yes, more blurry    Any eye pain today: No    Other ocular symptoms: right eye is blind    Interested in contact lens fitting today? No                    Last edited by Bradley Hatfield on 3/20/2023  9:19 AM.            Assessment /Plan     For exam results, see Encounter Report.    Blurred Vision right eye  Patient unsure of onset of blurred vision for OD  Vision limited by dense cataract   No retinal view on exam today due to opacity.  Consult Dr Arias next available for eval.     Combined form of age-related cataract, both eyes  OD>OS  New Rx for glasses/contacts will not improve vision.   Refer to Dr Arias for cataract evaluation.      Posterior synechiae (iris), right eye  No active uveitis/iritis at this time.   Consult Dr Arias for evaluation.     Glaucoma suspect both eyes  Based on Optic nerve findings, Patient will require baseline glaucoma evaluation once cataract is addressed.        RTC with Dr CPG next available for cataract evaluation w/ possible B-scan OD

## 2023-03-22 PROBLEM — R91.8 LUNG NODULE, MULTIPLE: Status: ACTIVE | Noted: 2023-03-22

## 2023-03-22 PROBLEM — J45.909 ASTHMA: Status: ACTIVE | Noted: 2023-03-22

## 2023-03-28 ENCOUNTER — PATIENT MESSAGE (OUTPATIENT)
Dept: INTERNAL MEDICINE | Facility: CLINIC | Age: 68
End: 2023-03-28
Payer: MEDICARE

## 2023-03-28 DIAGNOSIS — I10 PRIMARY HYPERTENSION: Primary | Chronic | ICD-10-CM

## 2023-03-28 DIAGNOSIS — E78.2 MIXED HYPERLIPIDEMIA: Chronic | ICD-10-CM

## 2023-03-29 ENCOUNTER — OFFICE VISIT (OUTPATIENT)
Dept: PULMONOLOGY | Facility: CLINIC | Age: 68
End: 2023-03-29
Payer: MEDICARE

## 2023-03-29 ENCOUNTER — TELEPHONE (OUTPATIENT)
Dept: INTERNAL MEDICINE | Facility: CLINIC | Age: 68
End: 2023-03-29
Payer: MEDICARE

## 2023-03-29 VITALS
SYSTOLIC BLOOD PRESSURE: 130 MMHG | OXYGEN SATURATION: 95 % | HEIGHT: 65 IN | HEART RATE: 75 BPM | BODY MASS INDEX: 24.75 KG/M2 | WEIGHT: 148.56 LBS | RESPIRATION RATE: 16 BRPM | DIASTOLIC BLOOD PRESSURE: 80 MMHG

## 2023-03-29 DIAGNOSIS — Z87.891 HISTORY OF SMOKING 30 OR MORE PACK YEARS: ICD-10-CM

## 2023-03-29 DIAGNOSIS — Z23 NEED FOR IMMUNIZATION AGAINST INFLUENZA: ICD-10-CM

## 2023-03-29 DIAGNOSIS — R91.8 MULTIPLE LUNG NODULES ON CT: ICD-10-CM

## 2023-03-29 DIAGNOSIS — J44.89 ASTHMA WITH COPD: Primary | ICD-10-CM

## 2023-03-29 DIAGNOSIS — Z23 NEED FOR VACCINATION FOR PNEUMOCOCCUS: ICD-10-CM

## 2023-03-29 PROCEDURE — 1101F PR PT FALLS ASSESS DOC 0-1 FALLS W/OUT INJ PAST YR: ICD-10-PCS | Mod: CPTII,S$GLB,, | Performed by: INTERNAL MEDICINE

## 2023-03-29 PROCEDURE — 1159F MED LIST DOCD IN RCRD: CPT | Mod: CPTII,S$GLB,, | Performed by: INTERNAL MEDICINE

## 2023-03-29 PROCEDURE — 3008F PR BODY MASS INDEX (BMI) DOCUMENTED: ICD-10-PCS | Mod: CPTII,S$GLB,, | Performed by: INTERNAL MEDICINE

## 2023-03-29 PROCEDURE — 3044F HG A1C LEVEL LT 7.0%: CPT | Mod: CPTII,S$GLB,, | Performed by: INTERNAL MEDICINE

## 2023-03-29 PROCEDURE — 99999 PR PBB SHADOW E&M-EST. PATIENT-LVL III: CPT | Mod: PBBFAC,,, | Performed by: INTERNAL MEDICINE

## 2023-03-29 PROCEDURE — 3288F PR FALLS RISK ASSESSMENT DOCUMENTED: ICD-10-PCS | Mod: CPTII,S$GLB,, | Performed by: INTERNAL MEDICINE

## 2023-03-29 PROCEDURE — G0008 ADMIN INFLUENZA VIRUS VAC: HCPCS | Mod: S$GLB,,, | Performed by: INTERNAL MEDICINE

## 2023-03-29 PROCEDURE — 99214 PR OFFICE/OUTPT VISIT, EST, LEVL IV, 30-39 MIN: ICD-10-PCS | Mod: S$GLB,,, | Performed by: INTERNAL MEDICINE

## 2023-03-29 PROCEDURE — G0009 ADMIN PNEUMOCOCCAL VACCINE: HCPCS | Mod: S$GLB,,, | Performed by: INTERNAL MEDICINE

## 2023-03-29 PROCEDURE — 90694 FLU VACCINE - QUADRIVALENT - ADJUVANTED: ICD-10-PCS | Mod: S$GLB,,, | Performed by: INTERNAL MEDICINE

## 2023-03-29 PROCEDURE — 3288F FALL RISK ASSESSMENT DOCD: CPT | Mod: CPTII,S$GLB,, | Performed by: INTERNAL MEDICINE

## 2023-03-29 PROCEDURE — 3075F SYST BP GE 130 - 139MM HG: CPT | Mod: CPTII,S$GLB,, | Performed by: INTERNAL MEDICINE

## 2023-03-29 PROCEDURE — 1159F PR MEDICATION LIST DOCUMENTED IN MEDICAL RECORD: ICD-10-PCS | Mod: CPTII,S$GLB,, | Performed by: INTERNAL MEDICINE

## 2023-03-29 PROCEDURE — 90677 PCV20 VACCINE IM: CPT | Mod: S$GLB,,, | Performed by: INTERNAL MEDICINE

## 2023-03-29 PROCEDURE — 90677 PNEUMOCOCCAL CONJUGATE VACCINE 20-VALENT: ICD-10-PCS | Mod: S$GLB,,, | Performed by: INTERNAL MEDICINE

## 2023-03-29 PROCEDURE — G0009 PNEUMOCOCCAL CONJUGATE VACCINE 20-VALENT: ICD-10-PCS | Mod: S$GLB,,, | Performed by: INTERNAL MEDICINE

## 2023-03-29 PROCEDURE — 3075F PR MOST RECENT SYSTOLIC BLOOD PRESS GE 130-139MM HG: ICD-10-PCS | Mod: CPTII,S$GLB,, | Performed by: INTERNAL MEDICINE

## 2023-03-29 PROCEDURE — 99214 OFFICE O/P EST MOD 30 MIN: CPT | Mod: S$GLB,,, | Performed by: INTERNAL MEDICINE

## 2023-03-29 PROCEDURE — 3008F BODY MASS INDEX DOCD: CPT | Mod: CPTII,S$GLB,, | Performed by: INTERNAL MEDICINE

## 2023-03-29 PROCEDURE — 90694 VACC AIIV4 NO PRSRV 0.5ML IM: CPT | Mod: S$GLB,,, | Performed by: INTERNAL MEDICINE

## 2023-03-29 PROCEDURE — 3044F PR MOST RECENT HEMOGLOBIN A1C LEVEL <7.0%: ICD-10-PCS | Mod: CPTII,S$GLB,, | Performed by: INTERNAL MEDICINE

## 2023-03-29 PROCEDURE — 3079F DIAST BP 80-89 MM HG: CPT | Mod: CPTII,S$GLB,, | Performed by: INTERNAL MEDICINE

## 2023-03-29 PROCEDURE — G0008 FLU VACCINE - QUADRIVALENT - ADJUVANTED: ICD-10-PCS | Mod: S$GLB,,, | Performed by: INTERNAL MEDICINE

## 2023-03-29 PROCEDURE — 3079F PR MOST RECENT DIASTOLIC BLOOD PRESSURE 80-89 MM HG: ICD-10-PCS | Mod: CPTII,S$GLB,, | Performed by: INTERNAL MEDICINE

## 2023-03-29 PROCEDURE — 1101F PT FALLS ASSESS-DOCD LE1/YR: CPT | Mod: CPTII,S$GLB,, | Performed by: INTERNAL MEDICINE

## 2023-03-29 PROCEDURE — 99999 PR PBB SHADOW E&M-EST. PATIENT-LVL III: ICD-10-PCS | Mod: PBBFAC,,, | Performed by: INTERNAL MEDICINE

## 2023-03-29 RX ORDER — ALBUTEROL SULFATE 90 UG/1
2 AEROSOL, METERED RESPIRATORY (INHALATION) EVERY 6 HOURS PRN
Qty: 18 G | Refills: 5 | Status: SHIPPED | OUTPATIENT
Start: 2023-03-29 | End: 2023-10-30 | Stop reason: SDUPTHER

## 2023-03-29 RX ORDER — FLUTICASONE PROPIONATE AND SALMETEROL 250; 50 UG/1; UG/1
1 POWDER RESPIRATORY (INHALATION) 2 TIMES DAILY
Qty: 180 EACH | Refills: 3 | Status: SHIPPED | OUTPATIENT
Start: 2023-03-29 | End: 2024-03-28

## 2023-03-29 NOTE — TELEPHONE ENCOUNTER
Spoke to daughter she is needing a new prescription for a cholesterol machine. Pharmacy sent back due to her not picking up in time./CS

## 2023-03-29 NOTE — PROGRESS NOTES
Initial Outpatient Pulmonary Evaluation       SUBJECTIVE:     Chief Complaint   Patient presents with    Asthma       History of Present Illness:    Patient is a 67 y.o. male presents exercise care for COPD and asthma.      Asthma since childhood.  Complain of chronic coughing sputum production wheezing and SOB on exertion.      More than 30 pack year smoking.  Quit cold turkey 2020.      Former [employer] worker       Review of Systems   Constitutional:  Positive for fatigue. Negative for fever and chills.   HENT:  Negative for nosebleeds.    Eyes:  Negative for redness.   Respiratory:  Positive for cough, sputum production, shortness of breath, wheezing and use of rescue inhaler. Negative for choking.    Cardiovascular:  Negative for chest pain.   Genitourinary:  Negative for hematuria.   Endocrine:  Negative for cold intolerance.    Musculoskeletal:  Positive for arthralgias.   Skin:  Negative for rash.   Gastrointestinal:  Negative for vomiting.   Neurological:  Negative for syncope.   Hematological:  Negative for adenopathy.   Psychiatric/Behavioral:  Negative for confusion.      Review of patient's allergies indicates:  No Known Allergies    Current Outpatient Medications   Medication Sig Dispense Refill    amLODIPine (NORVASC) 5 MG tablet Take 1 tablet (5 mg total) by mouth once daily. 90 tablet 1    atorvastatin (LIPITOR) 80 MG tablet Take 1 tablet (80 mg total) by mouth once daily. 90 tablet 3    valsartan-hydrochlorothiazide (DIOVAN-HCT) 320-25 mg per tablet Take 1 tablet by mouth once daily. 90 tablet 1    albuterol (PROVENTIL/VENTOLIN HFA) 90 mcg/actuation inhaler Inhale 2 puffs into the lungs every 6 (six) hours as needed for Wheezing. Rescue 18 g 5    fluticasone-salmeterol diskus inhaler 250-50 mcg Inhale 1 puff into the lungs 2 (two) times daily. Controller 180 each 3     No current facility-administered medications for this visit.       Past  "Medical History:   Diagnosis Date    Primary hypertension 2023     Past Surgical History:   Procedure Laterality Date    PROSTATE SURGERY       Family History   Problem Relation Age of Onset    No Known Problems Mother     No Known Problems Father      Social History     Tobacco Use    Smoking status: Former     Packs/day: 1.00     Years: 55.00     Pack years: 55.00     Types: Cigarettes     Start date:      Quit date:      Years since quittin.2    Smokeless tobacco: Former    Tobacco comments:     Pt quit smoking about 2 years ago.   Substance Use Topics    Alcohol use: Not Currently    Drug use: Yes     Types: Marijuana          OBJECTIVE:     Vital Signs (Most Recent)  Vital Signs  Pulse: 75  Resp: 16  SpO2: 95 %  BP: 130/80  Height and Weight  Height: 5' 5" (165.1 cm)  Weight: 67.4 kg (148 lb 9.4 oz)  BSA (Calculated - sq m): 1.76 sq meters  BMI (Calculated): 24.7  Weight in (lb) to have BMI = 25: 149.9]  Wt Readings from Last 2 Encounters:   23 67.4 kg (148 lb 9.4 oz)   23 65.8 kg (145 lb 1 oz)         Physical Exam:  Physical Exam   Constitutional: He is oriented to person, place, and time. He appears well-developed and well-nourished. No distress.   HENT:   Head: Normocephalic.   Cardiovascular: Normal rate and regular rhythm.   Pulmonary/Chest: Normal expansion and effort normal. No stridor. No respiratory distress. He has decreased breath sounds. He exhibits no tenderness.   Abdominal: He exhibits no distension.   Musculoskeletal:         General: No tenderness.      Cervical back: Neck supple.   Lymphadenopathy:     He has no cervical adenopathy.   Neurological: He is alert and oriented to person, place, and time. Gait normal.   Skin: Skin is warm. Nails show clubbing.   Psychiatric: He has a normal mood and affect. His behavior is normal. Judgment and thought content normal.   Nursing note and vitals reviewed.    Laboratory  Lab Results   Component Value Date    WBC 7.66 " 01/25/2023    RBC 4.67 01/25/2023    HGB 12.6 (L) 01/25/2023    HCT 42.2 01/25/2023    MCV 90 01/25/2023    MCH 27.0 01/25/2023    MCHC 29.9 (L) 01/25/2023    RDW 14.8 (H) 01/25/2023     01/25/2023    MPV 11.0 01/25/2023       BMP  Lab Results   Component Value Date     01/25/2023    K 4.9 01/25/2023     01/25/2023    CO2 29 01/25/2023    BUN 11 01/25/2023    CREATININE 0.8 01/25/2023    CALCIUM 10.2 01/25/2023    ANIONGAP 11 01/25/2023    AST 16 01/25/2023    ALT 17 01/25/2023    PROT 8.8 (H) 01/25/2023       No results found for: BNP    Lab Results   Component Value Date    TSH 2.094 01/25/2023       No results found for: SEDRATE    No results found for: CRP    No results found for: IGE    No results found for: ASPERGILLUS  No results found for: AFUMIGATUSCL     No results found for: ACE    Diagnostic Results:  I have personally reviewed today the following studies :      February 2023 low-dose CT scan chest      DATE: 2/3/2023 9:07 AM     CLINICAL HISTORY:  Risk factors for lung cancer.  Tobacco use. Greater than 30 pack-year smoking history. Lung cancer screening, >= 30 pk-yr current smoker (Age 55-80y); [Z87.891]-Personal history of nicotine dependence./[Z12.2]-Encounter for screening for malignant neoplasm of respiratory organs.     COMPARISON: No prior chest CT available.     TECHNIQUE:  Axial CT imaging was performed of the chest utilizing a low-dose protocol. All CT scans at this facility use dose modulation, iterative reconstruction, and/or weight based dosing when appropriate to reduce radiation dose to as low as reasonably achievable.  Nodules reported in average transaxial dimension.     FINDINGS:  Lungs/pleura: Moderate to severe centrilobular emphysematous changes and cystic lung disease.  The largest nodule in the right lung measures 9 mm, right lower lobe and is associated with calcification (series 4, image 365).  The largest lesion in the left lung measures 7 mm (series 4, image  27).  No pleural effusion or pneumothorax.  Heart: No significant cardiomegaly or pericardial effusion. No significant coronary artery atherosclerotic calcification.  Lymph nodes: No pathologic adenopathy.  Vascular: Nonaneurysmal aorta.  Osseous: No acute fracture or suspicious appearing osseous lesion.  Miscellaneous: No acute abnormality in the visualized abdomen.        Impression:     1.    Indeterminate 7 mm nodule in the left upper lobe  2.    There is a 8 mm nodule in the medial right lower lobe associated with a calcification and may represent a granuloma.      ASSESSMENT/PLAN:     Asthma with COPD  -     Ambulatory referral/consult to Pulmonology  -     albuterol (PROVENTIL/VENTOLIN HFA) 90 mcg/actuation inhaler; Inhale 2 puffs into the lungs every 6 (six) hours as needed for Wheezing. Rescue  Dispense: 18 g; Refill: 5  -     fluticasone-salmeterol diskus inhaler 250-50 mcg; Inhale 1 puff into the lungs 2 (two) times daily. Controller  Dispense: 180 each; Refill: 3  -     Complete PFT with bronchodilator; Future; Expected date: 04/12/2023  -     Stress test, pulmonary; Future    Need for immunization against influenza  -     Influenza - Quadrivalent (Adjuvanted)    Need for vaccination for pneumococcus  -     (In Office Administered) Pneumococcal Conjugate Vaccine (20 Valent) (IM)    History of smoking 30 or more pack years      Low-dose CT of the chest August 2023 surveillance for lung nodules ordered by PMD Dr. Krishna    Albuterol p.r.n. start Advair 250 mcg 1 puff twice daily.      Check PFT and 6 minute walk test.      Encouraged to continue smoking cessation.        Follow up in about 6 months (around 9/29/2023).    This note was prepared using voice recognition system and is likely to have sound alike errors that may have been overlooked even after proof reading.  Please call me with any questions    Discussed diagnosis, its evaluation, treatment and usual course. All questions answered.    Thank  you for the courtesy of participating in the care of this patient    Susy Chacon MD

## 2023-04-01 NOTE — TELEPHONE ENCOUNTER
Musa Merrill.    I received your message. I assume you are referring to the Digital Medicine programs.    Enrolling is easy and quick. Just call the [facility] Digital [facility] Help Desk at [phone] to get started. You can also stop by the [facility] in the first-floor lobby at [facility] for help getting set up and for in-person technical support.     All the best,    THIERNO Krishna MD    Orders Placed This Encounter   Procedures    Hypertension Digital Medicine (HDMP) Enrollment Order    Lipids Digital Medicine (LDMP) Enrollment Order

## 2023-04-19 ENCOUNTER — TELEPHONE (OUTPATIENT)
Dept: PULMONOLOGY | Facility: CLINIC | Age: 68
End: 2023-04-19
Payer: MEDICARE

## 2023-04-19 ENCOUNTER — PATIENT MESSAGE (OUTPATIENT)
Dept: ADMINISTRATIVE | Facility: HOSPITAL | Age: 68
End: 2023-04-19
Payer: MEDICARE

## 2023-05-02 ENCOUNTER — PATIENT MESSAGE (OUTPATIENT)
Dept: ADMINISTRATIVE | Facility: HOSPITAL | Age: 68
End: 2023-05-02
Payer: MEDICARE

## 2023-05-02 ENCOUNTER — PATIENT MESSAGE (OUTPATIENT)
Dept: ADMINISTRATIVE | Facility: OTHER | Age: 68
End: 2023-05-02
Payer: MEDICARE

## 2023-05-02 DIAGNOSIS — Z12.12 ENCOUNTER FOR COLORECTAL CANCER SCREENING: Primary | ICD-10-CM

## 2023-05-02 DIAGNOSIS — Z12.11 ENCOUNTER FOR COLORECTAL CANCER SCREENING: Primary | ICD-10-CM

## 2023-06-06 ENCOUNTER — TELEPHONE (OUTPATIENT)
Dept: PULMONOLOGY | Facility: CLINIC | Age: 68
End: 2023-06-06
Payer: MEDICARE

## 2023-06-22 ENCOUNTER — TELEPHONE (OUTPATIENT)
Dept: INTERNAL MEDICINE | Facility: CLINIC | Age: 68
End: 2023-06-22
Payer: MEDICARE

## 2023-06-22 NOTE — TELEPHONE ENCOUNTER
----- Message from Amber Verma, RT sent at 6/22/2023  8:50 AM CDT -----  Regarding: CT scheduled  Good Morning,   Mr Vigil has a CT scheduled for us on Monday. Per Dr Krishna's notes he should be scheduled for August for his CT. Please reschedule him accordingly.    Thank You,   Amber Verma   CT [facility]  [phone]

## 2023-06-29 ENCOUNTER — OFFICE VISIT (OUTPATIENT)
Dept: OPHTHALMOLOGY | Facility: CLINIC | Age: 68
End: 2023-06-29
Payer: MEDICARE

## 2023-06-29 DIAGNOSIS — H40.003 GLAUCOMA SUSPECT, BOTH EYES: ICD-10-CM

## 2023-06-29 DIAGNOSIS — H21.541 POSTERIOR SYNECHIAE (IRIS), RIGHT EYE: ICD-10-CM

## 2023-06-29 DIAGNOSIS — H25.12 NUCLEAR SCLEROSIS OF LEFT EYE: Primary | ICD-10-CM

## 2023-06-29 DIAGNOSIS — H25.813 COMBINED FORM OF AGE-RELATED CATARACT, BOTH EYES: ICD-10-CM

## 2023-06-29 PROCEDURE — 1159F MED LIST DOCD IN RCRD: CPT | Mod: CPTII,S$GLB,, | Performed by: OPHTHALMOLOGY

## 2023-06-29 PROCEDURE — 1160F PR REVIEW ALL MEDS BY PRESCRIBER/CLIN PHARMACIST DOCUMENTED: ICD-10-PCS | Mod: CPTII,S$GLB,, | Performed by: OPHTHALMOLOGY

## 2023-06-29 PROCEDURE — 1159F PR MEDICATION LIST DOCUMENTED IN MEDICAL RECORD: ICD-10-PCS | Mod: CPTII,S$GLB,, | Performed by: OPHTHALMOLOGY

## 2023-06-29 PROCEDURE — 99999 PR PBB SHADOW E&M-EST. PATIENT-LVL II: CPT | Mod: PBBFAC,,, | Performed by: OPHTHALMOLOGY

## 2023-06-29 PROCEDURE — 99999 PR PBB SHADOW E&M-EST. PATIENT-LVL II: ICD-10-PCS | Mod: PBBFAC,,, | Performed by: OPHTHALMOLOGY

## 2023-06-29 PROCEDURE — 92004 COMPRE OPH EXAM NEW PT 1/>: CPT | Mod: S$GLB,,, | Performed by: OPHTHALMOLOGY

## 2023-06-29 PROCEDURE — 3044F PR MOST RECENT HEMOGLOBIN A1C LEVEL <7.0%: ICD-10-PCS | Mod: CPTII,S$GLB,, | Performed by: OPHTHALMOLOGY

## 2023-06-29 PROCEDURE — 92004 PR EYE EXAM, NEW PATIENT,COMPREHESV: ICD-10-PCS | Mod: S$GLB,,, | Performed by: OPHTHALMOLOGY

## 2023-06-29 PROCEDURE — 1160F RVW MEDS BY RX/DR IN RCRD: CPT | Mod: CPTII,S$GLB,, | Performed by: OPHTHALMOLOGY

## 2023-06-29 PROCEDURE — 3044F HG A1C LEVEL LT 7.0%: CPT | Mod: CPTII,S$GLB,, | Performed by: OPHTHALMOLOGY

## 2023-06-29 RX ORDER — GATIFLOXACIN 5 MG/ML
1 SOLUTION/ DROPS OPHTHALMIC 2 TIMES DAILY
Qty: 5 ML | Refills: 2 | Status: SHIPPED | OUTPATIENT
Start: 2023-06-29 | End: 2023-07-07 | Stop reason: SDUPTHER

## 2023-06-29 RX ORDER — KETOROLAC TROMETHAMINE 5 MG/ML
1 SOLUTION OPHTHALMIC 4 TIMES DAILY
Qty: 5 ML | Refills: 2 | Status: SHIPPED | OUTPATIENT
Start: 2023-06-29

## 2023-06-29 RX ORDER — PREDNISOLONE ACETATE 10 MG/ML
1 SUSPENSION/ DROPS OPHTHALMIC 4 TIMES DAILY
Qty: 5 ML | Refills: 2 | Status: SHIPPED | OUTPATIENT
Start: 2023-06-29

## 2023-06-29 NOTE — PROGRESS NOTES
SUBJECTIVE  Musa JOHNSON Lyburn is 67 y.o. male  Uncorrected distance visual acuity was NLP in the right eye and 20/80- in the left eye. Corrected distance visual acuity was not recorded in the right eye and 20/60-2 in the left eye.   Chief Complaint   Patient presents with    Cataract     Cat eval          HPI     Cataract     Additional comments: Cat eval           Comments    States that his vision is very poor and that he can not see out of his OD   states that its been like this for about a year. States that he would like   to have sx if it will allow him to see again. Denies any pain or   discomfort at this time.           Last edited by Ghazal Ndiaye on 6/29/2023 10:20 AM.         Assessment /Plan :  1. Nuclear sclerosis of left eye  Visually Significant Cataract OS > OD:   Patient reports decreased vision consistent with the clinical amount of lenticular opacity,  which reaches the level of visual significance and affects activities of daily living such as ability to read, drive, watch TV. Risks, benefits, and alternatives to cataract surgery were  discussed.  IOL options were discussed, including Premium IOL'S and the associated  side effects and additional patient cost associated with them as well as patient's visual  goals. The pt expressed a desire to proceed with surgery with the potential for some  reasonable degree of visual improvement and was consented.  Risks of loss of vision and eye reviewed as well as possibility of need for spectacle correction after surgery even with premium implants. Verbal and written preop  instructions were provided to the patient.       Pt is interested in traditional monofocal IOL aiming for:    Distance OU and understands that glasses will be generally needed at all times for near vision and often for finer distance correction especially for higher degrees of astigmatism.       Final visual acuity may be limited by astigmatism    Pt wishes to have Phaco/IOL  OS      Requests a Monofocal IOL.    Will aim for distance    Other considerations: None      Following medications prescribed:  Prednisolone   Ketorolac   Gatifloxacin/ moxifloxacin              2. Combined form of age-related cataract, both eyes    3. Posterior synechiae (iris), right eye    4. Glaucoma suspect, both eyes

## 2023-07-06 ENCOUNTER — PATIENT MESSAGE (OUTPATIENT)
Dept: ADMINISTRATIVE | Facility: OTHER | Age: 68
End: 2023-07-06
Payer: MEDICARE

## 2023-07-06 ENCOUNTER — PATIENT MESSAGE (OUTPATIENT)
Dept: OPHTHALMOLOGY | Facility: CLINIC | Age: 68
End: 2023-07-06
Payer: MEDICARE

## 2023-07-07 ENCOUNTER — PATIENT MESSAGE (OUTPATIENT)
Dept: INFECTIOUS DISEASES | Facility: CLINIC | Age: 68
End: 2023-07-07
Payer: MEDICARE

## 2023-07-07 DIAGNOSIS — H25.12 NUCLEAR SCLEROSIS OF LEFT EYE: ICD-10-CM

## 2023-07-10 RX ORDER — GATIFLOXACIN 5 MG/ML
1 SOLUTION/ DROPS OPHTHALMIC 2 TIMES DAILY
Qty: 5 ML | Refills: 2 | Status: SHIPPED | OUTPATIENT
Start: 2023-07-10

## 2023-07-26 ENCOUNTER — OUTSIDE PLACE OF SERVICE (OUTPATIENT)
Dept: OPHTHALMOLOGY | Facility: CLINIC | Age: 68
End: 2023-07-26
Payer: MEDICARE

## 2023-07-26 PROCEDURE — 66984 PR REMOVAL, CATARACT, W/INSRT INTRAOC LENS, W/O ENDO CYCLO: ICD-10-PCS | Mod: LT,,, | Performed by: OPHTHALMOLOGY

## 2023-07-26 PROCEDURE — 66984 XCAPSL CTRC RMVL W/O ECP: CPT | Mod: LT,,, | Performed by: OPHTHALMOLOGY

## 2023-07-27 ENCOUNTER — OFFICE VISIT (OUTPATIENT)
Dept: OPHTHALMOLOGY | Facility: CLINIC | Age: 68
End: 2023-07-27
Payer: MEDICARE

## 2023-07-27 DIAGNOSIS — Z98.890 POST-OPERATIVE STATE: Primary | ICD-10-CM

## 2023-07-27 PROCEDURE — 1160F PR REVIEW ALL MEDS BY PRESCRIBER/CLIN PHARMACIST DOCUMENTED: ICD-10-PCS | Mod: CPTII,S$GLB,, | Performed by: OPHTHALMOLOGY

## 2023-07-27 PROCEDURE — 3044F HG A1C LEVEL LT 7.0%: CPT | Mod: CPTII,S$GLB,, | Performed by: OPHTHALMOLOGY

## 2023-07-27 PROCEDURE — 99999 PR PBB SHADOW E&M-EST. PATIENT-LVL II: ICD-10-PCS | Mod: PBBFAC,,, | Performed by: OPHTHALMOLOGY

## 2023-07-27 PROCEDURE — 99024 PR POST-OP FOLLOW-UP VISIT: ICD-10-PCS | Mod: S$GLB,,, | Performed by: OPHTHALMOLOGY

## 2023-07-27 PROCEDURE — 99999 PR PBB SHADOW E&M-EST. PATIENT-LVL II: CPT | Mod: PBBFAC,,, | Performed by: OPHTHALMOLOGY

## 2023-07-27 PROCEDURE — 3044F PR MOST RECENT HEMOGLOBIN A1C LEVEL <7.0%: ICD-10-PCS | Mod: CPTII,S$GLB,, | Performed by: OPHTHALMOLOGY

## 2023-07-27 PROCEDURE — 99024 POSTOP FOLLOW-UP VISIT: CPT | Mod: S$GLB,,, | Performed by: OPHTHALMOLOGY

## 2023-07-27 PROCEDURE — 1160F RVW MEDS BY RX/DR IN RCRD: CPT | Mod: CPTII,S$GLB,, | Performed by: OPHTHALMOLOGY

## 2023-07-27 PROCEDURE — 1159F MED LIST DOCD IN RCRD: CPT | Mod: CPTII,S$GLB,, | Performed by: OPHTHALMOLOGY

## 2023-07-27 PROCEDURE — 1159F PR MEDICATION LIST DOCUMENTED IN MEDICAL RECORD: ICD-10-PCS | Mod: CPTII,S$GLB,, | Performed by: OPHTHALMOLOGY

## 2023-07-27 NOTE — PROGRESS NOTES
SUBJECTIVE  Musa Jangchester is 67 y.o. male  Uncorrected distance visual acuity was not recorded in the right eye and 20/70 +1 in the left eye.   Chief Complaint   Patient presents with    Post-op Evaluation     Pt reports for 1 day PCIOL OS. Denies any pain or irritation. Va stable. 100% compliant with gtts.           HPI     Post-op Evaluation     Additional comments: Pt reports for 1 day PCIOL OS. Denies any pain or   irritation. Va stable. 100% compliant with gtts.            Comments      Cataracts OD  PCIOL OS 7/26/23    Pred/Keto/Khushboo OS            Last edited by Jose Avelar on 7/27/2023  7:41 AM.         Assessment /Plan :  1. Post-operative state Exam:  SLE:  S/C: normal  K : trace k fold  AC: trace cell and flare  Iris: normal  Lens: PCIOL    IMP:  PO Day 1 S/P Phaco/IOL OS : Doing well.    Plan:  Continue gtts to operative eye:  Pred 1% QID  Ketorolac QID  Zymaxid/moxifloxacin BID  Reinstructed in importance of absolute compliance with Post-OP instructions including medications, shield at bedtime, and limitation of activities. Follow up appointments in approximately one and six weeks or call immediately for increased pain, redness or vision loss.

## 2023-08-03 ENCOUNTER — OFFICE VISIT (OUTPATIENT)
Dept: OPHTHALMOLOGY | Facility: CLINIC | Age: 68
End: 2023-08-03
Payer: MEDICARE

## 2023-08-03 DIAGNOSIS — Z98.890 POST-OPERATIVE STATE: Primary | ICD-10-CM

## 2023-08-03 PROCEDURE — 3044F PR MOST RECENT HEMOGLOBIN A1C LEVEL <7.0%: ICD-10-PCS | Mod: CPTII,S$GLB,, | Performed by: OPHTHALMOLOGY

## 2023-08-03 PROCEDURE — 99999 PR PBB SHADOW E&M-EST. PATIENT-LVL II: ICD-10-PCS | Mod: PBBFAC,,, | Performed by: OPHTHALMOLOGY

## 2023-08-03 PROCEDURE — 1159F MED LIST DOCD IN RCRD: CPT | Mod: CPTII,S$GLB,, | Performed by: OPHTHALMOLOGY

## 2023-08-03 PROCEDURE — 1159F PR MEDICATION LIST DOCUMENTED IN MEDICAL RECORD: ICD-10-PCS | Mod: CPTII,S$GLB,, | Performed by: OPHTHALMOLOGY

## 2023-08-03 PROCEDURE — 1160F PR REVIEW ALL MEDS BY PRESCRIBER/CLIN PHARMACIST DOCUMENTED: ICD-10-PCS | Mod: CPTII,S$GLB,, | Performed by: OPHTHALMOLOGY

## 2023-08-03 PROCEDURE — 99024 PR POST-OP FOLLOW-UP VISIT: ICD-10-PCS | Mod: S$GLB,,, | Performed by: OPHTHALMOLOGY

## 2023-08-03 PROCEDURE — 99024 POSTOP FOLLOW-UP VISIT: CPT | Mod: S$GLB,,, | Performed by: OPHTHALMOLOGY

## 2023-08-03 PROCEDURE — 1160F RVW MEDS BY RX/DR IN RCRD: CPT | Mod: CPTII,S$GLB,, | Performed by: OPHTHALMOLOGY

## 2023-08-03 PROCEDURE — 99999 PR PBB SHADOW E&M-EST. PATIENT-LVL II: CPT | Mod: PBBFAC,,, | Performed by: OPHTHALMOLOGY

## 2023-08-03 PROCEDURE — 3044F HG A1C LEVEL LT 7.0%: CPT | Mod: CPTII,S$GLB,, | Performed by: OPHTHALMOLOGY

## 2023-08-03 NOTE — PROGRESS NOTES
SUBJECTIVE  Musa ALEX Beth is 67 y.o. male  Uncorrected distance visual acuity was not recorded in the right eye and 20/40 +1 in the left eye.   Chief Complaint   Patient presents with    Post-op Evaluation     Pt reports for 1 week PCIOL OS. Denies any pain but OS is itchy and slightly irritated. Va stable. 100% compliant with gtts.           HPI     Post-op Evaluation     Additional comments: Pt reports for 1 week PCIOL OS. Denies any pain but   OS is itchy and slightly irritated. Va stable. 100% compliant with gtts.            Comments    Cataracts OD  PCIOL OS 7/26/23 (+17.0 SY60WF)    Pred/Keto/Khushboo OS            Last edited by Jose Avelar on 8/3/2023  9:41 AM.         Assessment /Plan :  1. Post-operative state Slit lamp exam:  L/L: nl  K: clear, wound sealed  AC: trace cell and flare  Iris/Lens: IOL centered and stable      IMP:  PO Week 1 S/P Phaco/ IOL OS : Doing well with no evidence of infection or abnormal inflammation.     Plan:  Pt given and instructed in one week PO instructions. D/C zymaxid/moxifloxacin and start to taper off Ketorlac and Pred Forte 1% weekly. Can resume normal activitites and d/c eye shield. OTC reading glasses can be used until evaluated for final MR. Follow up in one month with Dr. Castro or PRN pain, redness, vision loss, or other concerns.

## 2023-09-11 ENCOUNTER — OFFICE VISIT (OUTPATIENT)
Dept: OPHTHALMOLOGY | Facility: CLINIC | Age: 68
End: 2023-09-11
Payer: MEDICARE

## 2023-09-11 DIAGNOSIS — Z98.890 POST-OPERATIVE STATE: Primary | ICD-10-CM

## 2023-09-11 PROCEDURE — 1159F MED LIST DOCD IN RCRD: CPT | Mod: CPTII,S$GLB,, | Performed by: OPTOMETRIST

## 2023-09-11 PROCEDURE — 3044F HG A1C LEVEL LT 7.0%: CPT | Mod: CPTII,S$GLB,, | Performed by: OPTOMETRIST

## 2023-09-11 PROCEDURE — 99024 POSTOP FOLLOW-UP VISIT: CPT | Mod: S$GLB,,, | Performed by: OPTOMETRIST

## 2023-09-11 PROCEDURE — 1159F PR MEDICATION LIST DOCUMENTED IN MEDICAL RECORD: ICD-10-PCS | Mod: CPTII,S$GLB,, | Performed by: OPTOMETRIST

## 2023-09-11 PROCEDURE — 99024 PR POST-OP FOLLOW-UP VISIT: ICD-10-PCS | Mod: S$GLB,,, | Performed by: OPTOMETRIST

## 2023-09-11 PROCEDURE — 99999 PR PBB SHADOW E&M-EST. PATIENT-LVL III: CPT | Mod: PBBFAC,,, | Performed by: OPTOMETRIST

## 2023-09-11 PROCEDURE — 99999 PR PBB SHADOW E&M-EST. PATIENT-LVL III: ICD-10-PCS | Mod: PBBFAC,,, | Performed by: OPTOMETRIST

## 2023-09-11 PROCEDURE — 3044F PR MOST RECENT HEMOGLOBIN A1C LEVEL <7.0%: ICD-10-PCS | Mod: CPTII,S$GLB,, | Performed by: OPTOMETRIST

## 2023-09-11 NOTE — PROGRESS NOTES
HPI    Vision stable  No pain or discomfort  Cataracts OD  PCIOL OS 7/26/23 (+17.0 SY60WF)    Pred/Keto/Khushboo OS BID  Last edited by La Phelan, PCT on 9/11/2023  9:30 AM.            Assessment /Plan     For exam results, see Encounter Report.    Post-operative state      Assessment:    S/P CEIOL OS : Doing Well and completing healing process. Final visual correction options discussed and appropriate prescriptions and/or OTC reading glass recommendations offered to patient. Mrx offered. Pt instructed to follow up in 4 months for next eye exam or PRN any pain, redness, vision changes or other concerns.     Pred Taper 2-1-stop   Keto Taper 2-1-stop    RTC in 4 months for DFE, sooner if changes to vision or worsening symptoms.   Discussed above and answered questions.

## 2023-09-11 NOTE — PATIENT INSTRUCTIONS
Prednisolone Acetate 1% (pink top)     left     2x daily for 1 week   1x daily for 1 week   then STOP     KETOROLAC Taper (chino top)  left     2x daily for 1 week  1x daily for 1 week     Then STOP

## 2023-09-12 ENCOUNTER — PATIENT MESSAGE (OUTPATIENT)
Dept: PULMONOLOGY | Facility: CLINIC | Age: 68
End: 2023-09-12
Payer: MEDICARE

## 2023-10-25 ENCOUNTER — OFFICE VISIT (OUTPATIENT)
Dept: PULMONOLOGY | Facility: CLINIC | Age: 68
End: 2023-10-25
Payer: MEDICARE

## 2023-10-25 ENCOUNTER — LAB VISIT (OUTPATIENT)
Dept: LAB | Facility: HOSPITAL | Age: 68
End: 2023-10-25
Attending: FAMILY MEDICINE
Payer: MEDICARE

## 2023-10-25 VITALS
BODY MASS INDEX: 25.27 KG/M2 | RESPIRATION RATE: 17 BRPM | WEIGHT: 151.69 LBS | DIASTOLIC BLOOD PRESSURE: 80 MMHG | SYSTOLIC BLOOD PRESSURE: 142 MMHG | OXYGEN SATURATION: 93 % | HEIGHT: 65 IN | HEART RATE: 63 BPM

## 2023-10-25 DIAGNOSIS — R91.8 LUNG NODULE, MULTIPLE: ICD-10-CM

## 2023-10-25 DIAGNOSIS — J45.909 ASTHMA, UNSPECIFIED ASTHMA SEVERITY, UNSPECIFIED WHETHER COMPLICATED, UNSPECIFIED WHETHER PERSISTENT: Primary | ICD-10-CM

## 2023-10-25 DIAGNOSIS — E78.2 MIXED HYPERLIPIDEMIA: Chronic | ICD-10-CM

## 2023-10-25 LAB
ALBUMIN SERPL BCP-MCNC: 3.1 G/DL (ref 3.5–5.2)
ALP SERPL-CCNC: 85 U/L (ref 55–135)
ALT SERPL W/O P-5'-P-CCNC: 23 U/L (ref 10–44)
ANION GAP SERPL CALC-SCNC: 10 MMOL/L (ref 8–16)
AST SERPL-CCNC: 19 U/L (ref 10–40)
BILIRUB SERPL-MCNC: 0.3 MG/DL (ref 0.1–1)
BUN SERPL-MCNC: 15 MG/DL (ref 8–23)
CALCIUM SERPL-MCNC: 9.4 MG/DL (ref 8.7–10.5)
CHLORIDE SERPL-SCNC: 105 MMOL/L (ref 95–110)
CHOLEST SERPL-MCNC: 223 MG/DL (ref 120–199)
CHOLEST/HDLC SERPL: 5.7 {RATIO} (ref 2–5)
CO2 SERPL-SCNC: 26 MMOL/L (ref 23–29)
CREAT SERPL-MCNC: 0.9 MG/DL (ref 0.5–1.4)
EST. GFR  (NO RACE VARIABLE): >60 ML/MIN/1.73 M^2
GLUCOSE SERPL-MCNC: 106 MG/DL (ref 70–110)
HDLC SERPL-MCNC: 39 MG/DL (ref 40–75)
HDLC SERPL: 17.5 % (ref 20–50)
LDLC SERPL CALC-MCNC: 145.2 MG/DL (ref 63–159)
NONHDLC SERPL-MCNC: 184 MG/DL
POTASSIUM SERPL-SCNC: 4.6 MMOL/L (ref 3.5–5.1)
PROT SERPL-MCNC: 8.6 G/DL (ref 6–8.4)
SODIUM SERPL-SCNC: 141 MMOL/L (ref 136–145)
TRIGL SERPL-MCNC: 194 MG/DL (ref 30–150)

## 2023-10-25 PROCEDURE — 3079F DIAST BP 80-89 MM HG: CPT | Mod: CPTII,S$GLB,, | Performed by: INTERNAL MEDICINE

## 2023-10-25 PROCEDURE — 3044F PR MOST RECENT HEMOGLOBIN A1C LEVEL <7.0%: ICD-10-PCS | Mod: CPTII,S$GLB,, | Performed by: INTERNAL MEDICINE

## 2023-10-25 PROCEDURE — 3008F PR BODY MASS INDEX (BMI) DOCUMENTED: ICD-10-PCS | Mod: CPTII,S$GLB,, | Performed by: INTERNAL MEDICINE

## 2023-10-25 PROCEDURE — 99213 PR OFFICE/OUTPT VISIT, EST, LEVL III, 20-29 MIN: ICD-10-PCS | Mod: S$GLB,,, | Performed by: INTERNAL MEDICINE

## 2023-10-25 PROCEDURE — 3044F HG A1C LEVEL LT 7.0%: CPT | Mod: CPTII,S$GLB,, | Performed by: INTERNAL MEDICINE

## 2023-10-25 PROCEDURE — 1101F PR PT FALLS ASSESS DOC 0-1 FALLS W/OUT INJ PAST YR: ICD-10-PCS | Mod: CPTII,S$GLB,, | Performed by: INTERNAL MEDICINE

## 2023-10-25 PROCEDURE — 36415 COLL VENOUS BLD VENIPUNCTURE: CPT | Performed by: FAMILY MEDICINE

## 2023-10-25 PROCEDURE — 3077F SYST BP >= 140 MM HG: CPT | Mod: CPTII,S$GLB,, | Performed by: INTERNAL MEDICINE

## 2023-10-25 PROCEDURE — 1160F RVW MEDS BY RX/DR IN RCRD: CPT | Mod: CPTII,S$GLB,, | Performed by: INTERNAL MEDICINE

## 2023-10-25 PROCEDURE — 99213 OFFICE O/P EST LOW 20 MIN: CPT | Mod: S$GLB,,, | Performed by: INTERNAL MEDICINE

## 2023-10-25 PROCEDURE — 1159F PR MEDICATION LIST DOCUMENTED IN MEDICAL RECORD: ICD-10-PCS | Mod: CPTII,S$GLB,, | Performed by: INTERNAL MEDICINE

## 2023-10-25 PROCEDURE — 3288F FALL RISK ASSESSMENT DOCD: CPT | Mod: CPTII,S$GLB,, | Performed by: INTERNAL MEDICINE

## 2023-10-25 PROCEDURE — 3288F PR FALLS RISK ASSESSMENT DOCUMENTED: ICD-10-PCS | Mod: CPTII,S$GLB,, | Performed by: INTERNAL MEDICINE

## 2023-10-25 PROCEDURE — 1160F PR REVIEW ALL MEDS BY PRESCRIBER/CLIN PHARMACIST DOCUMENTED: ICD-10-PCS | Mod: CPTII,S$GLB,, | Performed by: INTERNAL MEDICINE

## 2023-10-25 PROCEDURE — 3077F PR MOST RECENT SYSTOLIC BLOOD PRESSURE >= 140 MM HG: ICD-10-PCS | Mod: CPTII,S$GLB,, | Performed by: INTERNAL MEDICINE

## 2023-10-25 PROCEDURE — 1101F PT FALLS ASSESS-DOCD LE1/YR: CPT | Mod: CPTII,S$GLB,, | Performed by: INTERNAL MEDICINE

## 2023-10-25 PROCEDURE — 3079F PR MOST RECENT DIASTOLIC BLOOD PRESSURE 80-89 MM HG: ICD-10-PCS | Mod: CPTII,S$GLB,, | Performed by: INTERNAL MEDICINE

## 2023-10-25 PROCEDURE — 99999 PR PBB SHADOW E&M-EST. PATIENT-LVL III: ICD-10-PCS | Mod: PBBFAC,,, | Performed by: INTERNAL MEDICINE

## 2023-10-25 PROCEDURE — 99999 PR PBB SHADOW E&M-EST. PATIENT-LVL III: CPT | Mod: PBBFAC,,, | Performed by: INTERNAL MEDICINE

## 2023-10-25 PROCEDURE — 80053 COMPREHEN METABOLIC PANEL: CPT | Performed by: FAMILY MEDICINE

## 2023-10-25 PROCEDURE — 1159F MED LIST DOCD IN RCRD: CPT | Mod: CPTII,S$GLB,, | Performed by: INTERNAL MEDICINE

## 2023-10-25 PROCEDURE — 80061 LIPID PANEL: CPT | Performed by: FAMILY MEDICINE

## 2023-10-25 PROCEDURE — 3008F BODY MASS INDEX DOCD: CPT | Mod: CPTII,S$GLB,, | Performed by: INTERNAL MEDICINE

## 2023-10-25 NOTE — PROGRESS NOTES
"Subjective:      Patient ID: Musa Campos is a 68 y.o. male.    Chief Complaint: Asthma    Asthma  His past medical history is significant for asthma.       68-year-old male former smoker with longstanding asthma last seen by Dr. Gaitan in March of this year.  Had a initial screening low-dose CT in February of this year.  Repeat study has been ordered but not done as of yet on Advair 250 b.i.d. with as needed albuterol.  Only rare albuterol use.  Currently no pulmonary complaints.  Denies dyspnea on exertion, cough and wheeze.    Review of Systems as per hPI otherwise negative    Objective:     Physical Exam   Constitutional: He is oriented to person, place, and time. He appears well-developed. No distress.   HENT:   Head: Normocephalic.   Cardiovascular: Normal rate and regular rhythm.   Pulmonary/Chest: Normal expansion, symmetric chest wall expansion and effort normal.   Musculoskeletal:      Cervical back: Neck supple.   Neurological: He is alert and oriented to person, place, and time.   Psychiatric: He has a normal mood and affect.   Nursing note and vitals reviewed.          10/25/2023     9:48 AM 3/29/2023     9:43 AM 3/8/2023     9:40 AM 3/8/2023     8:51 AM 2/1/2023    12:44 PM 2/1/2023    10:30 AM 1/25/2023    10:36 AM   Pulmonary Function Tests   SpO2 93 % 95 % 95 %   96 % 98 %   Height 5' 5" (1.651 m) 5' 5" (1.651 m)  5' 5" (1.651 m)   5' 5" (1.651 m)   Weight 68.8 kg (151 lb 10.8 oz) 67.4 kg (148 lb 9.4 oz) 65.8 kg (145 lb 1 oz) 65.9 kg (145 lb 4.5 oz) 63.7 kg (140 lb 6.9 oz) 63.9 kg (140 lb 14 oz) 64 kg (141 lb 1.5 oz)   BMI (Calculated) 25.2 24.7 24.1 24.2   23.5        Assessment:     1. Asthma, unspecified asthma severity, unspecified whether complicated, unspecified whether persistent    2. Lung nodule, multiple        Plan:     Asthma well controlled on current regimen  Encouraged him to get his follow-up CT imaging done  If there are any detrimental changes on that CT we can address " them  Otherwise follow-up in 1 year, sooner if needed

## 2023-10-30 ENCOUNTER — OFFICE VISIT (OUTPATIENT)
Dept: OPHTHALMOLOGY | Facility: CLINIC | Age: 68
End: 2023-10-30
Payer: MEDICARE

## 2023-10-30 DIAGNOSIS — Z98.890 POST-OPERATIVE STATE: Primary | ICD-10-CM

## 2023-10-30 DIAGNOSIS — H21.541 POSTERIOR SYNECHIAE (IRIS), RIGHT EYE: ICD-10-CM

## 2023-10-30 DIAGNOSIS — J44.89 ASTHMA WITH COPD: ICD-10-CM

## 2023-10-30 DIAGNOSIS — I10 PRIMARY HYPERTENSION: Chronic | ICD-10-CM

## 2023-10-30 PROCEDURE — 99024 PR POST-OP FOLLOW-UP VISIT: ICD-10-PCS | Mod: S$GLB,,, | Performed by: OPTOMETRIST

## 2023-10-30 PROCEDURE — 1159F MED LIST DOCD IN RCRD: CPT | Mod: CPTII,S$GLB,, | Performed by: OPTOMETRIST

## 2023-10-30 PROCEDURE — 1126F PR PAIN SEVERITY QUANTIFIED, NO PAIN PRESENT: ICD-10-PCS | Mod: CPTII,S$GLB,, | Performed by: OPTOMETRIST

## 2023-10-30 PROCEDURE — 3044F PR MOST RECENT HEMOGLOBIN A1C LEVEL <7.0%: ICD-10-PCS | Mod: CPTII,S$GLB,, | Performed by: OPTOMETRIST

## 2023-10-30 PROCEDURE — 3044F HG A1C LEVEL LT 7.0%: CPT | Mod: CPTII,S$GLB,, | Performed by: OPTOMETRIST

## 2023-10-30 PROCEDURE — 99999 PR PBB SHADOW E&M-EST. PATIENT-LVL II: ICD-10-PCS | Mod: PBBFAC,,, | Performed by: OPTOMETRIST

## 2023-10-30 PROCEDURE — 99999 PR PBB SHADOW E&M-EST. PATIENT-LVL II: CPT | Mod: PBBFAC,,, | Performed by: OPTOMETRIST

## 2023-10-30 PROCEDURE — 1126F AMNT PAIN NOTED NONE PRSNT: CPT | Mod: CPTII,S$GLB,, | Performed by: OPTOMETRIST

## 2023-10-30 PROCEDURE — 1159F PR MEDICATION LIST DOCUMENTED IN MEDICAL RECORD: ICD-10-PCS | Mod: CPTII,S$GLB,, | Performed by: OPTOMETRIST

## 2023-10-30 PROCEDURE — 99024 POSTOP FOLLOW-UP VISIT: CPT | Mod: S$GLB,,, | Performed by: OPTOMETRIST

## 2023-10-30 RX ORDER — VALSARTAN AND HYDROCHLOROTHIAZIDE 320; 25 MG/1; MG/1
1 TABLET, FILM COATED ORAL DAILY
Qty: 90 TABLET | Refills: 1 | Status: SHIPPED | OUTPATIENT
Start: 2023-10-30 | End: 2024-02-20 | Stop reason: SDUPTHER

## 2023-10-30 RX ORDER — AMLODIPINE BESYLATE 5 MG/1
5 TABLET ORAL DAILY
Qty: 90 TABLET | Refills: 1 | Status: SHIPPED | OUTPATIENT
Start: 2023-10-30 | End: 2024-02-14 | Stop reason: SDUPTHER

## 2023-10-30 NOTE — PROGRESS NOTES
HPI     Follow-up            Comments: 4 month follow up PCIOL OS. VA is blurry in left sometimes. No   pain or irritation. Not on any gtts.          Comments    Pt of DKT    Cataracts OD  PCIOL OS 7/26/23 (+17.0 SY60WF            Last edited by Bradley Hatfield on 10/30/2023 10:46 AM.            Assessment /Plan     For exam results, see Encounter Report.    Post-operative state  Doing well, off all drops. Observe at this time.   Mrx given PRN. Monocular precautions reviewed.   Eyeglass Final Rx       Eyeglass Final Rx         Sphere Cylinder Axis Add    Right [place]       Left -0.25 +0.75 160 +2.50      Type: PAL- Polycarbonate    Expiration Date: 10/30/2024                      Posterior synechiae (iris), right eye  Seen Dr GUERRA, observe at this time.         RTC as scheduled for annual eye exam, sooner if any changes to vision worsening symptoms.

## 2023-10-31 RX ORDER — ALBUTEROL SULFATE 90 UG/1
2 AEROSOL, METERED RESPIRATORY (INHALATION) EVERY 6 HOURS PRN
Qty: 18 G | Refills: 11 | Status: SHIPPED | OUTPATIENT
Start: 2023-10-31 | End: 2024-10-30

## 2023-10-31 NOTE — PROGRESS NOTES
TO MY TEAM:   -Schedule OV to with me or Damari Roca PA-C within the next couple of weeks to review results and re-evaluate BP.  -Schedule OV F/U with me in January.    BACKGROUND: His last appointment with me was 1/25/23 with /98. He then saw Damari Roca PA-C on 2/1/23 with /89. He was supposed to follow-up with me later that month, but he never did.

## 2023-11-01 ENCOUNTER — TELEPHONE (OUTPATIENT)
Dept: INTERNAL MEDICINE | Facility: CLINIC | Age: 68
End: 2023-11-01
Payer: MEDICARE

## 2023-11-01 NOTE — TELEPHONE ENCOUNTER
----- Message from KONSTANTIN Krishna MD sent at 10/31/2023  4:53 PM CDT -----  TO MY TEAM:   -Schedule OV to with me or Damari Roca PA-C within the next couple of weeks to review results and re-evaluate BP.  -Schedule OV F/U with me in January.    BACKGROUND: His last appointment with me was 1/25/23 with /98. He then saw Damari Roca PA-C on 2/1/23 with /89. He was supposed to follow-up with me later that month, but he never did.

## 2024-01-22 ENCOUNTER — TELEPHONE (OUTPATIENT)
Dept: INTERNAL MEDICINE | Facility: CLINIC | Age: 69
End: 2024-01-22
Payer: MEDICARE

## 2024-01-26 ENCOUNTER — HOSPITAL ENCOUNTER (OUTPATIENT)
Dept: CARDIOLOGY | Facility: HOSPITAL | Age: 69
Discharge: HOME | End: 2024-01-26
Attending: FAMILY MEDICINE
Payer: MEDICARE

## 2024-01-26 ENCOUNTER — HOSPITAL ENCOUNTER (OUTPATIENT)
Dept: RADIOLOGY | Facility: HOSPITAL | Age: 69
Discharge: HOME | End: 2024-01-26
Attending: FAMILY MEDICINE
Payer: MEDICARE

## 2024-01-26 ENCOUNTER — OFFICE VISIT (OUTPATIENT)
Dept: INTERNAL MEDICINE | Facility: CLINIC | Age: 69
End: 2024-01-26
Payer: MEDICARE

## 2024-01-26 VITALS
HEART RATE: 89 BPM | TEMPERATURE: 97 F | SYSTOLIC BLOOD PRESSURE: 118 MMHG | OXYGEN SATURATION: 95 % | HEIGHT: 65 IN | BODY MASS INDEX: 25.3 KG/M2 | DIASTOLIC BLOOD PRESSURE: 60 MMHG | WEIGHT: 151.88 LBS | RESPIRATION RATE: 19 BRPM

## 2024-01-26 DIAGNOSIS — I49.9 IRREGULAR HEART BEATS: ICD-10-CM

## 2024-01-26 DIAGNOSIS — Z87.891 FORMER SMOKER: ICD-10-CM

## 2024-01-26 DIAGNOSIS — J41.0 SIMPLE CHRONIC BRONCHITIS: ICD-10-CM

## 2024-01-26 DIAGNOSIS — M25.571 PAIN IN JOINT OF RIGHT FOOT: ICD-10-CM

## 2024-01-26 DIAGNOSIS — R00.2 PALPITATIONS: ICD-10-CM

## 2024-01-26 DIAGNOSIS — R01.1 HEART MURMUR: ICD-10-CM

## 2024-01-26 DIAGNOSIS — Z85.46 HISTORY OF PROSTATE CANCER: ICD-10-CM

## 2024-01-26 DIAGNOSIS — I10 PRIMARY HYPERTENSION: Chronic | ICD-10-CM

## 2024-01-26 DIAGNOSIS — D64.9 MILD ANEMIA: Chronic | ICD-10-CM

## 2024-01-26 DIAGNOSIS — M25.571 PAIN IN JOINT OF RIGHT FOOT: Primary | ICD-10-CM

## 2024-01-26 DIAGNOSIS — R91.8 LUNG NODULE, MULTIPLE: ICD-10-CM

## 2024-01-26 DIAGNOSIS — E78.2 MIXED HYPERLIPIDEMIA: Chronic | ICD-10-CM

## 2024-01-26 DIAGNOSIS — R77.9 HIGH SERUM PROTEIN LEVEL: ICD-10-CM

## 2024-01-26 DIAGNOSIS — R77.9 ELEVATED SERUM PROTEIN LEVEL: ICD-10-CM

## 2024-01-26 PROCEDURE — 73630 X-RAY EXAM OF FOOT: CPT | Mod: 26,RT,, | Performed by: RADIOLOGY

## 2024-01-26 PROCEDURE — 1101F PT FALLS ASSESS-DOCD LE1/YR: CPT | Mod: CPTII,S$GLB,, | Performed by: FAMILY MEDICINE

## 2024-01-26 PROCEDURE — 3008F BODY MASS INDEX DOCD: CPT | Mod: CPTII,S$GLB,, | Performed by: FAMILY MEDICINE

## 2024-01-26 PROCEDURE — 1125F AMNT PAIN NOTED PAIN PRSNT: CPT | Mod: CPTII,S$GLB,, | Performed by: FAMILY MEDICINE

## 2024-01-26 PROCEDURE — 99999 PR PBB SHADOW E&M-EST. PATIENT-LVL V: CPT | Mod: PBBFAC,,, | Performed by: FAMILY MEDICINE

## 2024-01-26 PROCEDURE — 3078F DIAST BP <80 MM HG: CPT | Mod: CPTII,S$GLB,, | Performed by: FAMILY MEDICINE

## 2024-01-26 PROCEDURE — 99214 OFFICE O/P EST MOD 30 MIN: CPT | Mod: S$GLB,,, | Performed by: FAMILY MEDICINE

## 2024-01-26 PROCEDURE — 93005 ELECTROCARDIOGRAM TRACING: CPT

## 2024-01-26 PROCEDURE — 73630 X-RAY EXAM OF FOOT: CPT | Mod: TC,RT

## 2024-01-26 PROCEDURE — 3074F SYST BP LT 130 MM HG: CPT | Mod: CPTII,S$GLB,, | Performed by: FAMILY MEDICINE

## 2024-01-26 PROCEDURE — 3288F FALL RISK ASSESSMENT DOCD: CPT | Mod: CPTII,S$GLB,, | Performed by: FAMILY MEDICINE

## 2024-01-26 PROCEDURE — 93010 ELECTROCARDIOGRAM REPORT: CPT | Mod: ,,, | Performed by: INTERNAL MEDICINE

## 2024-01-26 RX ORDER — NAPROXEN 500 MG/1
500 TABLET ORAL 2 TIMES DAILY PRN
Qty: 30 TABLET | Refills: 1 | Status: SHIPPED | OUTPATIENT
Start: 2024-01-26

## 2024-01-26 NOTE — PROGRESS NOTES
OFFICE VISIT 1/26/24  9:00 AM CST    CHIEF COMPLAINT: Foot Injury    He returns after a prolonged absence. He reports that on January 7th, he dropped something on his right foot just proximal to his great toe. He went to the emergency department and had X-rays done there. They put him in a hard walking shoe and told him that they suspected gout. The history is atypical for gout. On exam, he has swelling but no hyperthermia, mild to moderate tenderness, and reproduction of pain on range of motion of the MTP joint. We discussed the differential diagnosis. It was agreed to proceed with the evaluation as ordered.    Previous labs have shown repeated elevated serum protein levels.    His hypertension appears controlled.    He has hyperlipidemia and likely would benefit from statin therapy. He declines that at this moment but agreed to address this at a future appointment.    Previous labs showed mild anemia, due for follow-up. He describes a daily productive cough for greater than two years, consistent with simple chronic bronchitis, which is consistent with his smoking history. It appears he has not been taking his inhalers as directed.    On exam, he has a heart murmur (previously noted) and frequent irregular beats. He notes occasional palpitations but without associated syncope or near syncope. He has had an echocardiogram previously ordered, but he never got it.    He is due for a diagnostic PSA for monitoring of his history of prostate cancer.    He has not gotten the previously ordered CT of the lungs to follow up on lung nodules previously noted.    We discussed strategies to help him better adhere to his medication regimen and agreed upon a treatment plan and follow-up.    1. Pain in 1st MTP joint of right foot  -     X-Ray Foot Complete 3 view Right; Future; Expected date: 01/26/2024  -     Uric Acid; Future; Expected date: 01/26/2024  -     Ambulatory referral/consult to Podiatry; Future; Expected date:  02/02/2024  -     CBC Auto Differential; Future; Expected date: 01/26/2024  -     naproxen (NAPROSYN) 500 MG tablet; Take 1 tablet (500 mg total) by mouth 2 (two) times daily as needed (for pain in joints).  Dispense: 30 tablet; Refill: 1    2. High serum protein level  -     PROTEIN ELECTROPHORESIS, SERUM; Future; Expected date: 01/26/2024  -     Protein electrophoresis, timed urine; Future    3. Mixed hyperlipidemia  -     Lipid Panel; Future; Expected date: 01/26/2024    4. Primary hypertension    5. Former smoker  Overview:  Quit 2021. 55 pack-year smoking history.      6. Elevated serum protein level    7. Mild anemia  -     CBC Auto Differential; Future; Expected date: 01/26/2024  -     Ferritin; Future; Expected date: 01/26/2024  -     Iron and TIBC; Future; Expected date: 01/26/2024    8. Simple chronic bronchitis    9. Irregular heart beats  -     Echo Saline Bubble? No; Future  -     SCHEDULED EKG 12-LEAD (to Muse); Future  -     Cardiac Monitor - 3-15 Day Adult (Cupid Only); Future    10. Heart murmur  -     Echo Saline Bubble? No; Future    11. History of prostate cancer  -     PROSTATE SPECIFIC ANTIGEN, DIAGNOSTIC; Future; Expected date: 01/26/2024    12. Lung nodule, multiple  -     CT Chest Without Contrast; Future; Expected date: 01/26/2024    13. Palpitations  -     Cardiac Monitor - 3-15 Day Adult (Cupid Only); Future    Unless noted herein, any chronic conditions are represented as and appear stable, and no other significant complaints or concerns were reported.    Follow up in about 2 weeks (around 2/9/2024) for office visit, review test results after completion, discuss plan, re-evaluation.   Future Appointments   Date Time Provider Department Center   1/26/2024 10:15 AM [facility] XR2 [facility]GV XRAY [facility]   2/26/2024 10:15 AM Karl Castro OD [facility] OPHTHAL [facility]   10/23/2024  9:45 AM [facility] Juan [facility]       Review of Systems   Constitutional:  Negative for fever.  "  Respiratory:  Negative for chest tightness and shortness of breath.    Cardiovascular:  Negative for chest pain.   Endocrine: Negative for polydipsia and polyuria.       Vitals:    01/26/24 0841   BP: 118/60   BP Location: Left arm   Patient Position: Sitting   BP Method: Large (Manual)   Pulse: 89   Resp: 19   Temp: 96.7 °F (35.9 °C)   SpO2: 95%   Weight: 68.9 kg (151 lb 14.4 oz)   Height: 5' 5" (1.651 m)   Physical Exam  Vitals reviewed.   Constitutional:       General: He is not in acute distress.     Appearance: Normal appearance. He is not ill-appearing or diaphoretic.   Cardiovascular:      Rate and Rhythm: Normal rate. Rhythm irregular.      Heart sounds: Murmur heard.   Pulmonary:      Effort: Pulmonary effort is normal.      Breath sounds: Normal breath sounds.   Musculoskeletal:         General: Swelling and tenderness present.   Skin:     General: Skin is warm and dry.   Neurological:      Mental Status: He is alert and oriented to person, place, and time. Mental status is at baseline.   Psychiatric:         Mood and Affect: Mood normal.         Behavior: Behavior normal.         Judgment: Judgment normal.       Documentation entered by me for this encounter may have been done in part using speech-recognition technology. Although I have made an effort to ensure accuracy, "sound like" errors may exist and should be interpreted in context.  "

## 2024-02-03 ENCOUNTER — PATIENT MESSAGE (OUTPATIENT)
Dept: INTERNAL MEDICINE | Facility: CLINIC | Age: 69
End: 2024-02-03
Payer: MEDICARE

## 2024-02-03 ENCOUNTER — TELEPHONE (OUTPATIENT)
Dept: INTERNAL MEDICINE | Facility: CLINIC | Age: 69
End: 2024-02-03
Payer: MEDICARE

## 2024-02-03 DIAGNOSIS — M25.571 PAIN IN JOINT OF RIGHT FOOT: ICD-10-CM

## 2024-02-03 DIAGNOSIS — D47.2 MONOCLONAL GAMMOPATHY: Primary | Chronic | ICD-10-CM

## 2024-02-03 DIAGNOSIS — M19.071 PRIMARY OSTEOARTHRITIS OF RIGHT FOOT: ICD-10-CM

## 2024-02-03 DIAGNOSIS — R77.9 ABNORMALITY OF PLASMA PROTEIN: Chronic | ICD-10-CM

## 2024-02-03 PROBLEM — E79.0 HYPERURICEMIA: Chronic | Status: ACTIVE | Noted: 2024-02-03

## 2024-02-03 NOTE — TELEPHONE ENCOUNTER
"@MY TEAM:   I sent Musa the Patient Portal message below. Please contact Musa to relay message or at least verify that he read and understood the message.  Schedule him for labs ordered and message me with his permission for e-consult.  Schedule him   Orders Placed This Encounter    IMMUNOGLOBULIN FREE LT CHAINS BLOOD    BETA 2 MICROGLOBULIN, SERUM    Comprehensive Metabolic Panel      @ Damari Roca PA-C:  He has appointment with you on Monday, February 19th. This is just FYI only. No action required by you at this point. As always, thank you for your help caring for our patients!  --------------------------------------------------------------------------------   Garfield Vigil,    I hope you're feeling okay today.    I've reviewed your recent lab and foot X-ray results, and I want to tell you what we found.    First, your lab tests showed that the total amount of protein in your blood is a bit higher than usual. Proteins are like building blocks for your body, and they do a lot of important jobs. Usually, we have a mix of different types of proteins, but your tests showed some abnormal proteins not usually found and abnormal levels of other proteins. It's important to note that these tests don't tell us exactly what's going on, but they does mean we need to look into it further, so I've ordered the additional labs you need.    There were a couple of other lab results we need to discuss, including mild anemia and high uric acid (the chemical that causes gout), but we can address these results at your next appointment.    My team will be contacting you to help you schedule your labs and ask for your permission to consult one of our excellent hematologists (a specialist that diagnoses and treats diseases of the blood and blood-forming tissues) to review your history and test results and give their assessment and recommendations. (This type of consultation is called an "E-consult," and we typically get a response " "within five business days. E-consults are generally paid for by health insurance, and most of my patients have told me that they never received any charge for the E-consult. If your insurance requires you to have a co-pay to see a specialist, you may be charged a co-pay. Regardless, your cost for the e-consult will be less than your cost for an in-office consult.)    Second, your foot x-ray showed "degenerative" or "wear-and-tear" type changes. The x-rays did not show any fracture or dislocation. You can discuss all this with podiatrist (foot specialist) Maria Isabel Pappas DPM, at your appointment with her on Thursday, February 8th at 8:30 AM. You will like Dr. Pappas. She will take good care of you.     I've asked my team to contact you to schedule you for a follow-up appointment with me. If my team hasn't reached you by the time you read this, please reply to this message to move things forward.    Thanks for letting me take care of you,    Dr. ZARCO"

## 2024-02-08 ENCOUNTER — HOSPITAL ENCOUNTER (OUTPATIENT)
Dept: CARDIOLOGY | Facility: HOSPITAL | Age: 69
Discharge: HOME | End: 2024-02-08
Attending: FAMILY MEDICINE
Payer: MEDICARE

## 2024-02-08 ENCOUNTER — OFFICE VISIT (OUTPATIENT)
Dept: PODIATRY | Facility: CLINIC | Age: 69
End: 2024-02-08
Payer: MEDICARE

## 2024-02-08 ENCOUNTER — TELEPHONE (OUTPATIENT)
Dept: INTERNAL MEDICINE | Facility: CLINIC | Age: 69
End: 2024-02-08
Payer: MEDICARE

## 2024-02-08 ENCOUNTER — PATIENT MESSAGE (OUTPATIENT)
Dept: INTERNAL MEDICINE | Facility: CLINIC | Age: 69
End: 2024-02-08
Payer: MEDICARE

## 2024-02-08 ENCOUNTER — HOSPITAL ENCOUNTER (OUTPATIENT)
Dept: RADIOLOGY | Facility: HOSPITAL | Age: 69
Discharge: HOME | End: 2024-02-08
Attending: FAMILY MEDICINE
Payer: MEDICARE

## 2024-02-08 VITALS — BODY MASS INDEX: 25.3 KG/M2 | HEIGHT: 65 IN | WEIGHT: 151.88 LBS

## 2024-02-08 VITALS
WEIGHT: 151 LBS | DIASTOLIC BLOOD PRESSURE: 60 MMHG | BODY MASS INDEX: 25.16 KG/M2 | SYSTOLIC BLOOD PRESSURE: 118 MMHG | HEIGHT: 65 IN

## 2024-02-08 DIAGNOSIS — R01.1 HEART MURMUR: ICD-10-CM

## 2024-02-08 DIAGNOSIS — R91.8 LUNG NODULE, MULTIPLE: ICD-10-CM

## 2024-02-08 DIAGNOSIS — M10.9 ACUTE GOUT OF RIGHT FOOT, UNSPECIFIED CAUSE: Primary | ICD-10-CM

## 2024-02-08 DIAGNOSIS — I49.9 IRREGULAR HEART BEATS: ICD-10-CM

## 2024-02-08 DIAGNOSIS — K22.89 ESOPHAGEAL MASS: Primary | ICD-10-CM

## 2024-02-08 DIAGNOSIS — R00.2 PALPITATIONS: ICD-10-CM

## 2024-02-08 DIAGNOSIS — M25.571 PAIN IN JOINT OF RIGHT FOOT: ICD-10-CM

## 2024-02-08 PROCEDURE — 71250 CT THORAX DX C-: CPT | Mod: 26,,, | Performed by: RADIOLOGY

## 2024-02-08 PROCEDURE — 93306 TTE W/DOPPLER COMPLETE: CPT

## 2024-02-08 PROCEDURE — 99204 OFFICE O/P NEW MOD 45 MIN: CPT | Mod: S$GLB,,, | Performed by: PODIATRIST

## 2024-02-08 PROCEDURE — 93306 TTE W/DOPPLER COMPLETE: CPT | Mod: 26,,, | Performed by: INTERNAL MEDICINE

## 2024-02-08 PROCEDURE — 93244 EXT ECG>48HR<7D REV&INTERPJ: CPT | Mod: ,,, | Performed by: INTERNAL MEDICINE

## 2024-02-08 PROCEDURE — 71250 CT THORAX DX C-: CPT | Mod: TC

## 2024-02-08 PROCEDURE — 99999 PR PBB SHADOW E&M-EST. PATIENT-LVL III: CPT | Mod: PBBFAC,,, | Performed by: PODIATRIST

## 2024-02-08 RX ORDER — COLCHICINE 0.6 MG/1
0.6 TABLET ORAL DAILY
Qty: 6 TABLET | Refills: 0 | Status: SHIPPED | OUTPATIENT
Start: 2024-02-08 | End: 2024-03-04 | Stop reason: SDUPTHER

## 2024-02-08 RX ORDER — ALLOPURINOL 100 MG/1
100 TABLET ORAL DAILY
Qty: 30 TABLET | Refills: 1 | Status: SHIPPED | OUTPATIENT
Start: 2024-02-08 | End: 2024-03-04 | Stop reason: SDUPTHER

## 2024-02-08 RX ORDER — METHYLPREDNISOLONE 4 MG/1
4 TABLET ORAL DAILY
Qty: 21 TABLET | Refills: 0 | Status: SHIPPED | OUTPATIENT
Start: 2024-02-08 | End: 2024-03-04 | Stop reason: SDUPTHER

## 2024-02-08 NOTE — PROGRESS NOTES
See Telephone Encounter dated 02/08/2024.  --------------------------------------------------------------------------------   Zak Vigil,    I hope this message finds you well.    I wanted to discuss the results of your recent CT chest scan. The images show an abnormal area in the upper part of your esophagus (the tube that connects your mouth to your stomach). There is a concern that this could be a mass, which we need to investigate to understand exactly what it is.    To get a closer look and more information, I've ordered for you a procedure called an EGD. This is a test where a small camera on a flexible tube is gently guided down your throat to directly view the esophagus and stomach. It's an important step to help us determine the best course of action.    Please know that our team at [facility] is here to support you through this process. Someone from [facility] will be reaching out to you shortly to help schedule your EGD. If you haven't been contacted within the next 3-4 business days, call [facility] [place] appointment desk ([phone]).     I understand that this may be an unsettling time for you, but please rest assured that you are in good hands, and we're taking the necessary steps to take care of your health.    Warm regards,    Dr. ZARCO

## 2024-02-08 NOTE — PROGRESS NOTES
Subjective:     Patient ID: Musa Jangchester is a 68 y.o. male.    Chief Complaint: Foot Pain (C/o right foot pain, started two weeks ago after class fell on foot, rates pain 8/10. Wearing surgery shoe on right foot and tennis shoes on left, non-diabetic pt, last seen PCP Dr. Krishna 1/26/24.)    HPI: This 68 year old male presents today complaing of painful swollen right  foot.  Patient states pain and swelling just started after glass fell on foot early January. Patient states she swelling comes and goes but the pain has been constant since. Patient states he has been taking Tylenol and Naprosyn as instructe dby urgent care and PCP. Patient also states he has been wearing the post op shoe as instructed. When asked were to indicate where the pain is, patient points to right 1st MPJ. Patient admits history of gout but only on his left side.  Patient has no other pedial complaints at this time.     Patient Active Problem List   Diagnosis    Primary hypertension    Former smoker    Excessive cerumen in ear canal, bilateral    Presbycusis of both ears    Abnormality of plasma protein    Mixed hyperlipidemia    Mild anemia    History of prostate cancer    Asthma    Lung nodule, multiple    COPD - chronic bronchitis    Palpitations    Hyperuricemia    Monoclonal gammopathy       Medication List with Changes/Refills   New Medications    ALLOPURINOL (ZYLOPRIM) 100 MG TABLET    Take 1 tablet (100 mg total) by mouth once daily.    COLCHICINE (COLCRYS) 0.6 MG TABLET    Take 1 tablet (0.6 mg total) by mouth once daily. Take one tablet daily. for 6 days    METHYLPREDNISOLONE (MEDROL DOSEPACK) 4 MG TABLET    Take 1 tablet (4 mg total) by mouth once daily. Use as instructed on dose pack   Current Medications    ALBUTEROL (PROVENTIL/VENTOLIN HFA) 90 MCG/ACTUATION INHALER    Inhale 2 puffs into the lungs every 6 (six) hours as needed for Wheezing. Rescue    AMLODIPINE (NORVASC) 5 MG TABLET    Take 1 tablet (5 mg total) by  "mouth once daily.    ATORVASTATIN (LIPITOR) 80 MG TABLET    Take 1 tablet (80 mg total) by mouth once daily.    FLUTICASONE-SALMETEROL DISKUS INHALER 250-50 MCG    Inhale 1 puff into the lungs 2 (two) times daily. Controller    GATIFLOXACIN 0.5 % DROP DROPS    Place 1 drop into the left eye 2 (two) times daily. Eyedrops to start one day before surgery    KETOROLAC 0.5% (ACULAR) 0.5 % DROP    Place 1 drop into the left eye 4 (four) times daily. Eyedrops to start one day before surgery    NAPROXEN (NAPROSYN) 500 MG TABLET    Take 1 tablet (500 mg total) by mouth 2 (two) times daily as needed (for pain in joints).    PREDNISOLONE ACETATE (PRED FORTE) 1 % DRPS    Place 1 drop into the left eye 4 (four) times daily. Start one day before surgery    VALSARTAN-HYDROCHLOROTHIAZIDE (DIOVAN-HCT) 320-25 MG PER TABLET    Take 1 tablet by mouth once daily.       Review of patient's allergies indicates:  No Known Allergies    Past Surgical History:   Procedure Laterality Date    CATARACT EXTRACTION Left 07/26/2023    PROSTATE SURGERY         Family History   Problem Relation Age of Onset    No Known Problems Mother     No Known Problems Father        Social History     Socioeconomic History    Marital status:    Tobacco Use    Smoking status: Former     Current packs/day: 0.00     Average packs/day: 1 pack/day for 55.0 years (55.0 ttl pk-yrs)     Types: Cigarettes     Start date: 1966     Quit date: 2021     Years since quitting: 3.1    Smokeless tobacco: Former    Tobacco comments:     Pt quit smoking about 2 years ago.   Substance and Sexual Activity    Alcohol use: Not Currently    Drug use: Yes     Types: Marijuana    Sexual activity: Not Currently     Partners: Female       Vitals:    02/08/24 0817   Weight: 68.9 kg (151 lb 14.4 oz)   Height: 5' 5" (1.651 m)   PainSc:   8       Review of Systems   Constitutional:  Negative for chills and fever.   Respiratory:  Negative for shortness of breath.    Cardiovascular:  " Negative for chest pain, palpitations, orthopnea, claudication and leg swelling.   Gastrointestinal:  Negative for diarrhea, nausea and vomiting.   Musculoskeletal:  Positive for joint pain (right 1st MPJ).   Skin:  Negative for rash.   Neurological:  Negative for dizziness, tingling, sensory change, focal weakness and weakness.   Psychiatric/Behavioral: Negative.             Objective:      PHYSICAL EXAM: Apperance: Alert and orient in no distress,well developed, and with good attention to grooming and body habits  Patient presents ambulating in post op shoe on right and tennis shoe on left.   LOWER EXTREMITIES EXAM:   VASCULAR: Dorsalis pedis pulses 2/4 right and Posterior Tibial pulses 2/4 right.   DERMATOLOGICAL: No skin rashes, subcutaneous nodules, lesions, or ulcers observed right. (+) moderate edema, (--) erythema, (+) increased temperature noted to right 1st MPJ. Webspaces 1,2,3,4 clean, dry and without evidence of break in skin integrity right.   NEUROLOGICAL: Light touch, sharp-dull, proprioception all present and equal bilaterally.  MUSCULOSKELETAL: Muscle strength is 5/5 for foot inverters, everters, plantarflexors, and dorsiflexors. Muscle tone is normal. 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  Remaining joint spaces are well-preserved.           Assessment:       ICD-10-CM ICD-9-CM   1. Acute gout of right foot, unspecified cause - Right Foot  M10.9 274.01   2. Pain in joint of right foot - Right Foot  M25.571 719.47       Plan:   Acute gout of right foot, unspecified cause - Right Foot  -     Ambulatory referral/consult to Podiatry  -     colchicine (COLCRYS) 0.6 mg tablet; Take 1 tablet (0.6 mg total) by mouth once daily. Take one tablet daily. for 6 days  Dispense: 6 tablet; Refill: 0  -      allopurinoL (ZYLOPRIM) 100 MG tablet; Take 1 tablet (100 mg total) by mouth once daily.  Dispense: 30 tablet; Refill: 1  -     methylPREDNISolone (MEDROL DOSEPACK) 4 mg tablet; Take 1 tablet (4 mg total) by mouth once daily. Use as instructed on dose pack  Dispense: 21 tablet; Refill: 0  -     Sedimentation rate; Future; Expected date: 02/08/2024  -     C-Reactive Protein; Future; Expected date: 02/08/2024  -     Uric Acid; Future; Expected date: 02/08/2024    Pain in joint of right foot - Right Foot      I counseled the patient on his conditions, regarding findings of my examination, my impressions, and usual treatment plan.   Reviewed labs and x-rays results in exam room with patient and his daughter.   I explained to the patient that etiologies and treatment options for gout including rest, elevation, diet control, NSAID's, long term gout medication, and/or injection therapy.  Prescription written for Colchicine 0.6mg to be taken as prescribed.  Prescription written for Medrol Dosepak to be taken as directed on package. Discussed possible increase in blood sugar with taking steroid medication. Patient advised on the possible elevation of blood pressure sugar and caution to take pills as prescribed and to discontinue use if symptoms arise, patient agreed.  Prescription written for Allopurinol 100mg to be taken as prescribed.   Ordered uric acid, crp, and esr testing to be completed 02/19/2024  Patient to return in 4 weeks or sooner if needed.        Maria Isabel Pappas DPM  [facility]

## 2024-02-08 NOTE — TELEPHONE ENCOUNTER
TO MY TEAM:   I sent Musa the Patient Portal message below. Please contact Musa to relay message or at least verify that he read and understood the message.  --------------------------------------------------------------------------------   Dear Musa,    I hope this message finds you well.    I wanted to discuss the results of your recent CT chest scan. The images show an abnormal area in the upper part of your esophagus (the tube that connects your mouth to your stomach). There is a concern that this could be a mass, which we need to investigate to understand exactly what it is.    To get a closer look and more information, I've ordered for you a procedure called an EGD. This is a test where a small camera on a flexible tube is gently guided down your throat to directly view the esophagus and stomach. It's an important step to help us determine the best course of action.    Please know that our team at [facility] is here to support you through this process. Someone from [facility] will be reaching out to you shortly to help schedule your EGD. If you haven't been contacted within the next 3-4 business days, call [facility] [place] appointment desk ([phone]).     I understand that this may be an unsettling time for you, but please rest assured that you are in good hands, and we're taking the necessary steps to take care of your health.    Warm regards,    Dr. ZARCO    Orders Placed This Encounter   Procedures    Ambulatory referral/consult to Endo Procedure

## 2024-02-09 LAB
AORTIC ROOT ANNULUS: 3.03 CM
AV INDEX (PROSTH): 0.66
AV MEAN GRADIENT: 4 MMHG
AV PEAK GRADIENT: 10 MMHG
AV VALVE AREA BY VELOCITY RATIO: 2.26 CM²
AV VALVE AREA: 2.17 CM²
AV VELOCITY RATIO: 0.69
BSA FOR ECHO PROCEDURE: 1.77 M2
CV ECHO LV RWT: 0.41 CM
DOP CALC AO PEAK VEL: 1.56 M/S
DOP CALC AO VTI: 38.4 CM
DOP CALC LVOT AREA: 3.3 CM2
DOP CALC LVOT DIAMETER: 2.04 CM
DOP CALC LVOT PEAK VEL: 1.08 M/S
DOP CALC LVOT STROKE VOLUME: 83.3 CM3
DOP CALC RVOT PEAK VEL: 0.8 M/S
DOP CALC RVOT VTI: 22.8 CM
DOP CALCLVOT PEAK VEL VTI: 25.5 CM
E WAVE DECELERATION TIME: 209.34 MSEC
E/A RATIO: 1.33
E/E' RATIO: 13.13 M/S
ECHO LV POSTERIOR WALL: 1.03 CM (ref 0.6–1.1)
FRACTIONAL SHORTENING: 30 % (ref 28–44)
INTERVENTRICULAR SEPTUM: 0.99 CM (ref 0.6–1.1)
IVRT: 114.18 MSEC
LA MAJOR: 4.68 CM
LA MINOR: 5.67 CM
LA WIDTH: 3.7 CM
LEFT ATRIUM SIZE: 3.64 CM
LEFT ATRIUM VOLUME INDEX MOD: 32.7 ML/M2
LEFT ATRIUM VOLUME INDEX: 33.4 ML/M2
LEFT ATRIUM VOLUME MOD: 57.55 CM3
LEFT ATRIUM VOLUME: 58.7 CM3
LEFT INTERNAL DIMENSION IN SYSTOLE: 3.52 CM (ref 2.1–4)
LEFT VENTRICLE DIASTOLIC VOLUME INDEX: 68.73 ML/M2
LEFT VENTRICLE DIASTOLIC VOLUME: 120.96 ML
LEFT VENTRICLE MASS INDEX: 107 G/M2
LEFT VENTRICLE SYSTOLIC VOLUME INDEX: 29.2 ML/M2
LEFT VENTRICLE SYSTOLIC VOLUME: 51.46 ML
LEFT VENTRICULAR INTERNAL DIMENSION IN DIASTOLE: 5.05 CM (ref 3.5–6)
LEFT VENTRICULAR MASS: 187.47 G
LV LATERAL E/E' RATIO: 13.13 M/S
LV SEPTAL E/E' RATIO: 13.13 M/S
LVOT MG: 2.53 MMHG
LVOT MV: 0.75 CM/S
MV PEAK A VEL: 0.79 M/S
MV PEAK E VEL: 1.05 M/S
PISA TR MAX VEL: 3.04 M/S
PV MEAN GRADIENT: 2 MMHG
PV MV: 0.6 M/S
PV PEAK GRADIENT: 3 MMHG
PV PEAK VELOCITY: 0.91 M/S
RA MAJOR: 4.59 CM
RA PRESSURE ESTIMATED: 15 MMHG
RA WIDTH: 2.89 CM
RIGHT VENTRICULAR END-DIASTOLIC DIMENSION: 2.48 CM
RV TB RVSP: 18 MMHG
SINUS: 2.71 CM
STJ: 2.42 CM
TDI LATERAL: 0.08 M/S
TDI SEPTAL: 0.08 M/S
TDI: 0.08 M/S
TR MAX PG: 37 MMHG
TV REST PULMONARY ARTERY PRESSURE: 52 MMHG
Z-SCORE OF LEFT VENTRICULAR DIMENSION IN END DIASTOLE: 0.37
Z-SCORE OF LEFT VENTRICULAR DIMENSION IN END SYSTOLE: 1.24

## 2024-02-09 NOTE — TELEPHONE ENCOUNTER
----- Message from Roxanne Dhillon sent at 2/9/2024  9:39 AM CST -----  Regarding: EGD  Contact: patient  Called pt this morning and he is unaware of needing EGD, please call him back to discus.thanks

## 2024-02-12 DIAGNOSIS — E88.09 PLASMA CELL DYSCRASIA: Primary | ICD-10-CM

## 2024-02-12 DIAGNOSIS — I27.20 PULMONARY HYPERTENSION: ICD-10-CM

## 2024-02-12 DIAGNOSIS — I50.32 CHRONIC HEART FAILURE WITH PRESERVED EJECTION FRACTION: ICD-10-CM

## 2024-02-12 DIAGNOSIS — K22.89 ESOPHAGEAL MASS: ICD-10-CM

## 2024-02-12 NOTE — PROGRESS NOTES
Request for Hematology/Oncology E-Consult     Patient Name: Musa Campos  (68 y.o.)  MRN: [id number]  Requesting Provider: KONSTANTIN Krishna MD   Primary Care Provider: KONSTANTIN Krishna MD     Consent for e-consult has been obtained from patient, and patient is aware of applicable cost: Yes    Reason for Consult: Plasma cell dyscrasia (see labs below); incidental finding of esophageal mass on CT.    SPECIFIC QUESTIONS: I've already ordered EGD to evaluate esophageal mass. What additional/other next steps do you recommend for his evaluation/treatment?    Total time spent preparing for this E-consult and/or communicating with the consulting physician was greater than or equal to: 25 minutes. This time was exclusive of any separately billable procedures or separate E&M services for this patient and exclusive of time spent treating any other patients.    Lab Results   Component Value Date    PROTEINSERUM 8.5 (H) 01/26/2024    ALBUMINGRAMS 3.54 01/26/2024    HWZTL2MAZLTZ 0.37 01/26/2024    BETAGRAMSDL 0.70 01/26/2024    GAMMAGRAMSDL 3.09 (H) 01/26/2024    PATHINTPSPE REVIEWED 01/26/2024    IMMELINT SEE COMMENT 01/26/2024    PATHINTPSIF REVIEWED 01/26/2024    FREEKAPPALIG 3.36 (H) 02/08/2024    FREELAMBDALI 10.41 (H) 02/08/2024    KAPPALAMBDAF 0.32 02/08/2024    B2M 3.1 (H) 02/08/2024 02/03/2023 CT CHEST WITHOUT CONTRAST  Naomi/Mediastinum: No pathologic savannah enlargement.  Lungs/Pleura: 8 mm calcified nodular opacity in the right lower lobe on series 4, image 364 decreased in size. Resolution of prior 8 mm nodular opacity in the left upper lobe. Additional small nodular opacities elsewhere in both lungs not significantly changed. Largest current lesion in the right upper lobe on series 4, image 114 measures 5.0 mm. Bilateral emphysematous changes. No consolidation or pleural effusion.  Esophagus: Dilatation of mid to distal esophagus with intraluminal debris and eccentric masslike mural thickening at the level  of the roslyn.  Upper Abdomen: No significant abnormality of the partially imaged upper abdomen.  Bones: No acute fracture. No suspicious lytic or sclerotic lesions.  Impression  1. Abnormal appearance of the esophagus with concern for mass lesion at the level of the roslyn. Further evaluation recommended.  2. No detrimental change in bilateral lung nodules. See description above.    Orders Placed This Encounter   Procedures    [facility]     Order Specific Question:   Consent has been obtained from patient, and patient is aware of applicable cost? Pending specialist evaluation, I will follow up with the patient.     Answer:   Yes     Order Specific Question:   Medical Condition:     Answer:   Abnormal Lab Value     Order Specific Question:   Abnormal Lab Value:     Answer:   SPEP/Free Light Chains     Order Specific Question:   What is your clinical question?     Answer:   He has plasma cell dyscrasia and incidental finding of esophageal mass on CT. (See 02/12/2024 Orders-Only Encounter for details.) I've already ordered EGD to evaluate esophageal mass. What other next steps do you recommend for his evaluation/treatment?

## 2024-02-14 ENCOUNTER — TELEPHONE (OUTPATIENT)
Dept: INTERNAL MEDICINE | Facility: CLINIC | Age: 69
End: 2024-02-14
Payer: MEDICARE

## 2024-02-14 ENCOUNTER — OFFICE VISIT (OUTPATIENT)
Dept: INTERNAL MEDICINE | Facility: CLINIC | Age: 69
End: 2024-02-14
Payer: MEDICARE

## 2024-02-14 ENCOUNTER — PATIENT MESSAGE (OUTPATIENT)
Dept: INTERNAL MEDICINE | Facility: CLINIC | Age: 69
End: 2024-02-14

## 2024-02-14 ENCOUNTER — E-CONSULT (OUTPATIENT)
Dept: HEMATOLOGY/ONCOLOGY | Facility: CLINIC | Age: 69
End: 2024-02-14
Payer: MEDICARE

## 2024-02-14 VITALS — SYSTOLIC BLOOD PRESSURE: 118 MMHG | DIASTOLIC BLOOD PRESSURE: 60 MMHG

## 2024-02-14 DIAGNOSIS — I10 PRIMARY HYPERTENSION: Chronic | ICD-10-CM

## 2024-02-14 DIAGNOSIS — E88.09 PLASMA CELL DYSCRASIA: Primary | Chronic | ICD-10-CM

## 2024-02-14 DIAGNOSIS — D47.2 MONOCLONAL GAMMOPATHY: Primary | Chronic | ICD-10-CM

## 2024-02-14 DIAGNOSIS — D47.2 MONOCLONAL GAMMOPATHY: Primary | ICD-10-CM

## 2024-02-14 DIAGNOSIS — E78.2 MIXED HYPERLIPIDEMIA: Chronic | ICD-10-CM

## 2024-02-14 DIAGNOSIS — K22.89 ESOPHAGEAL MASS: Chronic | ICD-10-CM

## 2024-02-14 DIAGNOSIS — D47.2 SMOLDERING MULTIPLE MYELOMA: ICD-10-CM

## 2024-02-14 PROCEDURE — 99214 OFFICE O/P EST MOD 30 MIN: CPT | Mod: 95,,, | Performed by: FAMILY MEDICINE

## 2024-02-14 PROCEDURE — 99499 UNLISTED E&M SERVICE: CPT | Mod: S$GLB,,, | Performed by: INTERNAL MEDICINE

## 2024-02-14 RX ORDER — ATORVASTATIN CALCIUM 80 MG/1
80 TABLET, FILM COATED ORAL DAILY
Qty: 90 TABLET | Refills: 3 | Status: SHIPPED | OUTPATIENT
Start: 2024-02-14 | End: 2025-02-13

## 2024-02-14 RX ORDER — AMLODIPINE BESYLATE 5 MG/1
5 TABLET ORAL DAILY
Qty: 90 TABLET | Refills: 3 | Status: SHIPPED | OUTPATIENT
Start: 2024-02-14 | End: 2024-02-20

## 2024-02-14 NOTE — ASSESSMENT & PLAN NOTE
Lab Results   Component Value Date    CHOL 173 01/26/2024    CHOL 223 (H) 10/25/2023    TRIG 218 (H) 01/26/2024    TRIG 194 (H) 10/25/2023    HDL 35 (L) 01/26/2024    HDL 39 (L) 10/25/2023    LDLCALC 94.4 01/26/2024    LDLCALC 145.2 10/25/2023    NONHDLCHOL 138 01/26/2024    NONHDLCHOL 184 10/25/2023    AST 23 02/08/2024    ALT 26 02/08/2024     The 10-year ASCVD risk score (Lv CHAMBERLAIN, et al., 2019) is: 17.5%    Values used to calculate the score:      Age: 68 years      Sex: Male      Is Non- : Yes      Diabetic: No      Tobacco smoker: No      Systolic Blood Pressure: 118 mmHg      Is BP treated: Yes      HDL Cholesterol: 35 mg/dL      Total Cholesterol: 173 mg/dL

## 2024-02-14 NOTE — PROGRESS NOTES
TELEMEDICINE VIRTUAL VIDEO VISIT  2/14/24  1:20 PM CST    Visit Type: Audiovisual    Patient's Location: Musa represents that they are located within the [facility].    CHIEF COMPLAINT: Review test results    SUBJECTIVE  Musa has abnormal test results indicating possible multiple myeloma and a mass on the esophagus, in for a follow-up.    Musa presents abnormal test results suggesting multiple myeloma, identified by excessive protein production by cells in the bone marrow. This has been explained to the patient, with an recommendation for consultation with a hematologist-oncologist.    CAT scan reveals a moderate-sized mass in the upper part of his esophagus, larger than what is considered normal. Evaluation of the mass is forthcoming, including an EGD or upper endoscopy for a biopsy. The order for this test has been placed.    Musa is on several medications: amlodipine for blood pressure and atorvastatin for cholesterol, both of which are well-managed as per recent labs. He is also on naproxen, but its discontinuation is considered prior to an upcoming diagnostic procedure due to bleeding risk. This patient is recommended to switch to Tylenol if necessary.    PLAN  MULTIPLE MYELOMA:   Referred the patient to a hematologist/oncologist for further evaluation and potential treatment of multiple myeloma.   Explained to the patient that the abnormal labs suggest a condition called multiple myeloma, where cells in the bone marrow produce abnormal amounts of protein.   Expected the patient to follow up with the hematologist/oncologist.    ESOPHAGEAL MASS:   Ordered an upper endoscopy (EGD) to investigate the esophageal mass.   Advised the patient to abstain from naproxen for 7 days prior to the endoscopy due to the risk of increased bleeding, and informed them that Tylenol is a safe alternative.    PATIENT COMMUNICATION:   See Patient Portal message summarizing findings and next steps.    1. 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 Esophageal mass  -     Ambulatory referral/consult to Hematology / Oncology; Future; Expected date: 02/21/2024    3. Mixed hyperlipidemia  Assessment & Plan:  Lab Results   Component Value Date    CHOL 173 01/26/2024    CHOL 223 (H) 10/25/2023    TRIG 218 (H) 01/26/2024    TRIG 194 (H) 10/25/2023    HDL 35 (L) 01/26/2024    HDL 39 (L) 10/25/2023    LDLCALC 94.4 01/26/2024    LDLCALC 145.2 10/25/2023    NONHDLCHOL 138 01/26/2024    NONHDLCHOL 184 10/25/2023    AST 23 02/08/2024    ALT 26 02/08/2024     The 10-year ASCVD risk score (Lv CHAMBERLAIN, et al., 2019) is: 17.5%    Values used to calculate the score:      Age: 68 years      Sex: Male      Is Non- : Yes      Diabetic: No      Tobacco smoker: No      Systolic Blood Pressure: 118 mmHg      Is BP treated: Yes      HDL Cholesterol: 35 mg/dL      Total Cholesterol: 173 mg/dL     Orders:  -     atorvastatin (LIPITOR) 80 MG tablet; Take 1 tablet (80 mg total) by mouth once daily.  Dispense: 90 tablet; Refill: 3    4. Primary hypertension  Assessment & Plan:  BP Readings from Last 6 Encounters:   02/08/24 118/60   01/26/24 118/60   10/25/23 (!) 142/80   03/29/23 130/80   03/08/23 (!) 180/74   03/08/23 (!) 183/75     Lab Results   Component Value Date    EGFRNORACEVR >60.0 02/08/2024    CREATININE 0.9 02/08/2024    BUN 16 02/08/2024    K 4.7 02/08/2024     02/08/2024     02/08/2024     Results for orders placed or performed during the hospital encounter of 01/26/24   SCHEDULED EKG 12-LEAD (to Muse)    Collection Time: 01/26/24 10:36 AM    Narrative    Test Reason : I49.9,    Vent. Rate : 061 BPM     Atrial Rate : 061 BPM     P-R Int : 118 ms          QRS Dur : 096 ms      QT Int : 400 ms       P-R-T Axes : 071 045 055 degrees     QTc Int : 402 ms    Normal sinus rhythm  Normal ECG  When  compared with ECG of 25-JAN-2023 12:34,  No significant change was found  Confirmed by DEE DEE KEANE MD ([id number]) on 1/26/2024 7:36:27 PM    Referred By: KONSTANTIN LARSON           Confirmed By:DEE DEE KEANE MD        Orders:  -     Discontinue: amLODIPine (NORVASC) 5 MG tablet; Take 1 tablet (5 mg total) by mouth once daily.  Dispense: 90 tablet; Refill: 3      Education/instructions were reviewed and shared with Musa. Refer to Patient Portal Message dated 02/14/2024.     Review of Systems   Constitutional:  Negative for activity change and unexpected weight change.   HENT:  Negative for hearing loss, rhinorrhea and trouble swallowing.    Eyes:  Negative for discharge and visual disturbance.   Respiratory:  Negative for chest tightness and wheezing.    Cardiovascular:  Negative for chest pain and palpitations.   Gastrointestinal:  Negative for blood in stool, constipation, diarrhea and vomiting.   Endocrine: Negative for polydipsia and polyuria.   Genitourinary:  Negative for difficulty urinating, hematuria and urgency.   Musculoskeletal:  Negative for arthralgias, joint swelling and neck pain.   Neurological:  Negative for weakness and headaches.   Psychiatric/Behavioral:  Negative for confusion and dysphoric mood.        Physical Exam  Constitutional:       General: He is not in acute distress.     Appearance: Normal appearance. He is not ill-appearing.   Pulmonary:      Effort: Pulmonary effort is normal. No respiratory distress.   Skin:     Coloration: Skin is not jaundiced.   Neurological:      Mental Status: He is alert. Mental status is at baseline.   Psychiatric:         Mood and Affect: Mood normal.         Behavior: Behavior normal.         Thought Content: Thought content normal.       Future Appointments   Date Time Provider Department Center   2/19/2024  8:40 AM [facility]   2/19/2024 11:30 AM LABORATORY, [facility] LAB [facility]   2/26/2024 10:15 AM Karl Castro, ANCELMO HG  "OPHTHAL [facility]   2/29/2024 10:15 AM Maria Isabel Pappas DPM [facility] POD [facility]   10/23/2024  9:45 AM [facility] Juan [facility]     TOTAL TIME evaluating and managing this patient for this encounter was greater than or equal to 30 minutes.  This time was exclusive of any separately billable procedures for this patient and exclusive of time spent treating any other patient.    Documentation entered by me for this encounter may have been done in part using speech-recognition technology. Although I have made an effort to ensure accuracy, "sound like" errors may exist and should be interpreted in context.  Each patient to whom medical services are provided by telemedicine is: (1) informed of the relationship between the physician and patient and the respective role of any other health care provider with respect to management of the patient; and (2) notified that he or she may decline to receive medical services by telemedicine and may withdraw from such care at any time.  "

## 2024-02-14 NOTE — CONSULTS
[facility] - Hem Onc 3rd Fl  Response for E-Consult     Patient Name: Musa Campos  MRN: [id number]  Primary Care Provider: KONSTANTIN Krishna MD   Requesting Provider: KONSTANTIN Krishna MD  E-Consult to [place]  Consult performed by: Rupal Gamez MD  Consult ordered by: KONSTANTIN Krishna MD  Reason for consult: MGUS  Assessment/Recommendations: See consult           After evaluation of your eConsult clinical questions, I believe the patient should be scheduled for an office visit in our specialty due to the need to rule out smoldering myeloma or multiple myeloma with IgG Lambda monoclonal gammopathy as well as work up needed for the esophageal mass.     Please place another consult for in person Hematology clinic visit so he can be scheduled.     Total time of Consultation: 5 minute    *This eConsult is based on the clinical data available to me and is furnished without benefit of a physician examination.  The eConsult will need to be interpreted in light of any clinical issues of changes in patient status not available to me at the time rime of filing this eConsults.  Significant changes in patient condition of level of acuity should result in a referral for in person consultation and reevaluation.  Please alert me if you have any furth questions.     Thank you for this eConsult referral.     Rupal Gamez MD  [facility] - Hem Onc 3rd Fl

## 2024-02-14 NOTE — ASSESSMENT & PLAN NOTE
BP Readings from Last 6 Encounters:   02/08/24 118/60   01/26/24 118/60   10/25/23 (!) 142/80   03/29/23 130/80   03/08/23 (!) 180/74   03/08/23 (!) 183/75     Lab Results   Component Value Date    EGFRNORACEVR >60.0 02/08/2024    CREATININE 0.9 02/08/2024    BUN 16 02/08/2024    K 4.7 02/08/2024     02/08/2024     02/08/2024     Results for orders placed or performed during the hospital encounter of 01/26/24   SCHEDULED EKG 12-LEAD (to Muse)    Collection Time: 01/26/24 10:36 AM    Narrative    Test Reason : I49.9,    Vent. Rate : 061 BPM     Atrial Rate : 061 BPM     P-R Int : 118 ms          QRS Dur : 096 ms      QT Int : 400 ms       P-R-T Axes : 071 045 055 degrees     QTc Int : 402 ms    Normal sinus rhythm  Normal ECG  When compared with ECG of 25-JAN-2023 12:34,  No significant change was found  Confirmed by DEE DEE KEANE MD ([id number]) on 1/26/2024 7:36:27 PM    Referred By: KONSTANTIN LARSON           Confirmed By:DEE DEE KEANE MD

## 2024-02-16 ENCOUNTER — HOSPITAL ENCOUNTER (OUTPATIENT)
Dept: PREADMISSION TESTING | Facility: HOSPITAL | Age: 69
Discharge: HOME | End: 2024-02-16
Attending: FAMILY MEDICINE
Payer: MEDICARE

## 2024-02-16 ENCOUNTER — TELEPHONE (OUTPATIENT)
Dept: HEMATOLOGY/ONCOLOGY | Facility: CLINIC | Age: 69
End: 2024-02-16
Payer: MEDICARE

## 2024-02-16 ENCOUNTER — TELEPHONE (OUTPATIENT)
Dept: INTERNAL MEDICINE | Facility: CLINIC | Age: 69
End: 2024-02-16
Payer: MEDICARE

## 2024-02-16 ENCOUNTER — TELEPHONE (OUTPATIENT)
Dept: CARDIOLOGY | Facility: CLINIC | Age: 69
End: 2024-02-16
Payer: MEDICARE

## 2024-02-16 DIAGNOSIS — R00.1 BRADYCARDIA: Primary | ICD-10-CM

## 2024-02-16 DIAGNOSIS — K22.89 ESOPHAGEAL MASS: Primary | ICD-10-CM

## 2024-02-16 LAB
OHS CV HOLTER SINUS AVERAGE HR: 71
OHS CV HOLTER SINUS MAX HR: 127
OHS CV HOLTER SINUS MIN HR: 49

## 2024-02-16 NOTE — TELEPHONE ENCOUNTER
----- Message from Georgie Sarkar sent at 2/16/2024  2:47 PM CST -----  Contact: johnathon/daughter  Type:  Patient Returning Call    Who Called:Johnathon  Who Left Message for Patient:Angle  Does the patient know what this is regarding?:heart monitor   Would the patient rather a call back or a response via Zal[employer]er? Call back  Best Call Back Number:[phone]  Additional Information:     Thanks   Am

## 2024-02-16 NOTE — TELEPHONE ENCOUNTER
@Angle, please notify Musa that his cardiac monitor showed episodes when his heart was MUCH too slow and he needs to stop the amlodipine immediately. I've referred him to one of our excellent cardiologists. Someone from their office should be contacting him soon to help schedule his appointment.

## 2024-02-16 NOTE — NURSING
1350 pm: Outgoing call to pt daughter regarding referral in-basket msg. Spoke with pt daughter, Marychuy. Marychuy aware of referral for pt. Pt scheduled for PET scan on 2/28 at 945 am at  and for new Oncology appt on 2/28 at 11 am at [place] with Dr. Brooke at . Pt daughter given fasting instructions and location directions. Pt daughter verbalized understanding. Pt plan to report to appts as scheduled.   Oncology Navigation   Intake  Cancer Type: Other  Type of Referral: Internal (Dr. KONSTANTIN Krishna)  Date of Referral: 02/14/24  Initial Nurse Navigator Contact: 02/16/24  Referral to Initial Contact Timeline (days): 2  Date Worked: 02/16/24  First Appointment Available: 02/22/24  Appointment Date: 02/28/24  Schedule to Appointment Timeline (days): 12  First Available Date vs. Scheduled Date (days): 6  Multiple appointments: Yes  Reason if booked > 7 days after scheduling: Waiting for surgery / transplant / biopsy; Additional tests/procedures     Treatment  Current Status: Staging work-up       Medical Oncologist: Dr. Marcy Brooke  Consult Date: 02/28/24       Procedures: EUS / EGD; PET scan  EUS / EGD: EGD  EUS / EGD Date: 03/06/24  PET Scan Schedule Date: 02/28/24             Support Systems: Children     Acuity      Follow Up  No follow-ups on file.

## 2024-02-16 NOTE — TELEPHONE ENCOUNTER
appointment made to see Jun 2/20/24. Pt's daughter confirmed date/time and will have pt stop amlodipine

## 2024-02-16 NOTE — TELEPHONE ENCOUNTER
Spoke with pt's daughter, Marychuy, and informed her of pt's monitor results and medication change. Marychuy verbalized understanding and will have pt stop amlodipine. Appointment scheduled for pt to see Dr. Barahona 2/20/24 at 11:00. Marychuy confirmed appointment day/time and will call back with any further questions or concerns.    ------------------------------------------  please notify Musa that his cardiac monitor showed episodes when his heart was MUCH too slow and he needs to stop the amlodipine immediately. I've referred him to one of our excellent cardiologists. Someone from their office should be contacting him soon to help schedule his appointment.

## 2024-02-19 NOTE — PROGRESS NOTES
Subjective:   Patient ID:  Musa Campos is a 68 y.o. male who presents for cardiac consult of Follow-up (Heart monitor results. )    Referring Physician:   KONSTANTIN Krishna MD [[id number] [facility] [id number]     Reason for consult: HTN      HPI  The patient came in today for cardiac consult of Follow-up (Heart monitor results. )      Musa Campos is a 68 y.o. male pt with HTN, HFPEF, pulm HTN, HLD, anemia, former smoker, h/o prostate CA presents for follow up CV Eval.     3/8/23  Recent BP elevated 180s/70s. Today 180/74. HR 57. BMI 24 - 145 lbs. He ran out of BP meds 3 days ago, he did not realize meds were sent to [facility].     2/20/24  Follow up from 3/2023.   ECHO 2/2024 with normal bi V function, grade 2 DD, mod AV sclerosis without stenosis, mild MR, mild TR, inc CVP, PASP 52 mmHg.   Vital monitor 2/2024 2 pauses - > 5 seconds long; AVG HR 71 bpm.     BP and HR well controlled. Lipids overall improved, Tgs remain elevated    He had recent CT scan - Abnormal appearance of the esophagus with concern for mass lesion at the level of the roslyn.   Will have EGD      Patient is compliant with medications.    Results for orders placed during the hospital encounter of 02/08/24    Echo Saline Bubble? No    Interpretation Summary    Left Ventricle: The left ventricle is normal in size. Normal wall thickness. Normal wall motion. There is normal systolic function with a visually estimated ejection fraction of 55 - 60%. Grade II diastolic dysfunction.    Right Ventricle: Normal right ventricular cavity size. Wall thickness is normal. Right ventricle wall motion  is normal. Systolic function is normal.    Aortic Valve: There is moderate aortic valve sclerosis. Mildly calcified cusps. There is mild annular calcification present.    Mitral Valve: Mildly thickened leaflets. Mildly calcified leaflets. There is mild regurgitation.    Tricuspid Valve: There is mild regurgitation.     Pulmonary Artery: The estimated pulmonary artery systolic pressure is 52 mmHg.    IVC/SVC: Elevated venous pressure at 15 mmHg.      Conclusion 2/2024         The predominant rhythm is sinus.     The patient was monitored for a total of 3d 10h, underlying rhythm is Sinus.  The minimum heart rate was 49 bpm; the maximum 127 bpm; the average 71 bpm.  0 % of Atrial fibrillation/Atrial flutter with longest episode of 0 ms.  The total burden of AV Block present was 0 % [Complete Heart Block: 0 %; Advanced (High Grade):  0 %; 2nd Degree, Mobitz II: 0 %; 2nd Degree, Mobitz I: 0 %].  There were 2 pauses > 5 seconds , the longest pause was 5674 ms at Day 3 / 10:49:41 am.  Total count of Ventricular Tachycardia (VT): 0 episode(s). Longest VT: 0 s on --. Fastest VT: -- bpm on  --.  8 supraventricular episodes were found. Longest SVT Episode 7 beats, Fastest  bpm  There were a total of 975 PVCs with 2 morphologies and 4 couplets. Overall PVC [place] at 0.27 %  There were a total of 0 Other Beats. There were 0 total number of paced beats.  There were a total of 3485 PSVCs with 1 morphologies and 92 couplets. Overall PSVC [place] at  0.98 %  There is a total of 0 patient events.    Past Medical History:   Diagnosis Date    Abnormality of plasma protein 02/04/2023    Hyperuricemia 02/03/2024    Monoclonal gammopathy 02/03/2024    Primary hypertension 01/25/2023       Past Surgical History:   Procedure Laterality Date    CATARACT EXTRACTION Left 07/26/2023    PROSTATE SURGERY         Social History     Tobacco Use    Smoking status: Former     Current packs/day: 0.00     Average packs/day: 1 pack/day for 55.0 years (55.0 ttl pk-yrs)     Types: Cigarettes     Start date: 1966     Quit date: 2021     Years since quitting: 3.1    Smokeless tobacco: Former    Tobacco comments:     Pt quit smoking about 2 years ago.   Substance Use Topics    Alcohol use: Not Currently    Drug use: Yes     Types: Marijuana       Family History    Problem Relation Age of Onset    No Known Problems Mother     No Known Problems Father        Patient's Medications   New Prescriptions    No medications on file   Previous Medications    ALBUTEROL (PROVENTIL/VENTOLIN HFA) 90 MCG/ACTUATION INHALER    Inhale 2 puffs into the lungs every 6 (six) hours as needed for Wheezing. Rescue    ALLOPURINOL (ZYLOPRIM) 100 MG TABLET    Take 1 tablet (100 mg total) by mouth once daily.    AMLODIPINE (NORVASC) 5 MG TABLET    Take 1 tablet (5 mg total) by mouth once daily.    ATORVASTATIN (LIPITOR) 80 MG TABLET    Take 1 tablet (80 mg total) by mouth once daily.    COLCHICINE (COLCRYS) 0.6 MG TABLET    Take 1 tablet (0.6 mg total) by mouth once daily. Take one tablet daily. for 6 days    FLUTICASONE-SALMETEROL DISKUS INHALER 250-50 MCG    Inhale 1 puff into the lungs 2 (two) times daily. Controller    GATIFLOXACIN 0.5 % DROP DROPS    Place 1 drop into the left eye 2 (two) times daily. Eyedrops to start one day before surgery    KETOROLAC 0.5% (ACULAR) 0.5 % DROP    Place 1 drop into the left eye 4 (four) times daily. Eyedrops to start one day before surgery    METHYLPREDNISOLONE (MEDROL DOSEPACK) 4 MG TABLET    Take 1 tablet (4 mg total) by mouth once daily. Use as instructed on dose pack    NAPROXEN (NAPROSYN) 500 MG TABLET    Take 1 tablet (500 mg total) by mouth 2 (two) times daily as needed (for pain in joints).    PREDNISOLONE ACETATE (PRED FORTE) 1 % DRPS    Place 1 drop into the left eye 4 (four) times daily. Start one day before surgery    VALSARTAN-HYDROCHLOROTHIAZIDE (DIOVAN-HCT) 320-25 MG PER TABLET    Take 1 tablet by mouth once daily.   Modified Medications    No medications on file   Discontinued Medications    No medications on file       Review of Systems   Constitutional: Negative.    HENT: Negative.     Eyes: Negative.    Respiratory: Negative.     Cardiovascular: Negative.    Gastrointestinal: Negative.    Genitourinary: Negative.    Musculoskeletal: Negative.   "  Skin: Negative.    Neurological: Negative.    Endo/Heme/Allergies: Negative.    Psychiatric/Behavioral: Negative.     All 12 systems otherwise negative.      Wt Readings from Last 3 Encounters:   02/20/24 67.3 kg (148 lb 5.9 oz)   02/08/24 68.5 kg (151 lb)   02/08/24 68.9 kg (151 lb 14.4 oz)     Temp Readings from Last 3 Encounters:   01/26/24 96.7 °F (35.9 °C)   03/08/23 98.2 °F (36.8 °C) (Oral)   02/01/23 97.9 °F (36.6 °C) (Temporal)     BP Readings from Last 3 Encounters:   02/20/24 124/72   02/14/24 118/60   02/08/24 118/60     Pulse Readings from Last 3 Encounters:   02/20/24 78   01/26/24 89   10/25/23 63       /72 (BP Location: Right arm, Patient Position: Sitting, BP Method: Medium (Manual))   Pulse 78   Ht 5' 5" (1.651 m)   Wt 67.3 kg (148 lb 5.9 oz)   SpO2 95%   BMI 24.69 kg/m²     Objective:   Physical Exam  Vitals and nursing note reviewed.   Constitutional:       General: He is not in acute distress.     Appearance: He is well-developed. He is not diaphoretic.   HENT:      Head: Normocephalic and atraumatic.      Nose: Nose normal.   Eyes:      General: No scleral icterus.     Conjunctiva/sclera: Conjunctivae normal.   Neck:      Thyroid: No thyromegaly.      Vascular: No JVD.   Cardiovascular:      Rate and Rhythm: Normal rate and regular rhythm.      Heart sounds: S1 normal and S2 normal. Murmur heard.      No friction rub. No gallop. No S3 or S4 sounds.   Pulmonary:      Effort: Pulmonary effort is normal. No respiratory distress.      Breath sounds: No stridor. No wheezing or rales.   Chest:      Chest wall: No tenderness.   Abdominal:      General: Bowel sounds are normal. There is no distension.      Palpations: Abdomen is soft. There is no mass.      Tenderness: There is no abdominal tenderness. There is no rebound.   Genitourinary:     Comments: Deferred  Musculoskeletal:         General: No tenderness or deformity. Normal range of motion.      Cervical back: Normal range of motion " and neck supple.   Lymphadenopathy:      Cervical: No cervical adenopathy.   Skin:     General: Skin is warm and dry.      Coloration: Skin is not pale.      Findings: No erythema or rash.   Neurological:      Mental Status: He is alert and oriented to person, place, and time.      Motor: No abnormal muscle tone.      Coordination: Coordination normal.   Psychiatric:         Behavior: Behavior normal.         Thought Content: Thought content normal.         Judgment: Judgment normal.         Lab Results   Component Value Date     02/08/2024    K 4.7 02/08/2024     02/08/2024    CO2 31 (H) 02/08/2024    BUN 16 02/08/2024    CREATININE 0.9 02/08/2024    GLU 88 02/08/2024    HGBA1C 5.6 01/25/2023    AST 23 02/08/2024    ALT 26 02/08/2024    ALBUMIN 2.7 (L) 02/08/2024    PROT 8.4 02/08/2024    BILITOT 0.3 02/08/2024    WBC 8.67 01/26/2024    HGB 11.4 (L) 01/26/2024    HCT 37.5 (L) 01/26/2024    MCV 90 01/26/2024     01/26/2024    TSH 2.094 01/25/2023    CHOL 173 01/26/2024    HDL 35 (L) 01/26/2024    LDLCALC 94.4 01/26/2024    TRIG 218 (H) 01/26/2024     Assessment:      1. Chronic heart failure with preserved ejection fraction    2. Bradycardia    3. Irregular heart beats    4. Palpitations    5. Heart murmur    6. Mixed hyperlipidemia    7. History of prostate cancer    8. Primary hypertension    9. Asthma, unspecified asthma severity, unspecified whether complicated, unspecified whether persistent    10. Former smoker    11. Mild anemia    12. Pulmonary HTN          Plan:     HTN ; with HFPEF, pulm HTN, sinus pauses - stable now   - titrate meds   - ECHO 2/2024 with normal bi V function, grade 2 DD, mod AV sclerosis without stenosis, mild MR, mild TR, inc CVP, PASP 52 mmHg.   - Vital monitor 2/2024 2 pauses - > 5 seconds long; AVG HR 71 bpm.   - avoid AV savannah agents , off Norvasc not but it does not affect AV node   - order ECG stress test - eval for chronotropic competence     2. HLD with  elevated Tgs   - cont statin  - add fish oils     3. H/O Prostate CA  - f/u urology, will have repeat PSA  - had prior prostate surg    4. Anemia  - cont to monitor    5. Former smoker, with pulm HTN  - f/u pulm, cont tx   - cont cessation     LOW CV RISK FOR EGD - mass in chest      Visit today included increased complexity associated with the care of the episodic problem HTN addressed and managing the longitudinal care of the patient due to the serious and/or complex managed problem(s) .      Thank you for allowing me to participate in this patient's care. Please do not hesitate to contact me with any questions or concerns. Consult note has been forwarded to the referral physician.

## 2024-02-20 ENCOUNTER — OFFICE VISIT (OUTPATIENT)
Dept: CARDIOLOGY | Facility: CLINIC | Age: 69
End: 2024-02-20
Payer: MEDICARE

## 2024-02-20 ENCOUNTER — LAB VISIT (OUTPATIENT)
Dept: LAB | Facility: HOSPITAL | Age: 69
End: 2024-02-20
Attending: PODIATRIST
Payer: MEDICARE

## 2024-02-20 VITALS
HEIGHT: 65 IN | HEART RATE: 78 BPM | OXYGEN SATURATION: 95 % | DIASTOLIC BLOOD PRESSURE: 72 MMHG | BODY MASS INDEX: 24.72 KG/M2 | SYSTOLIC BLOOD PRESSURE: 124 MMHG | WEIGHT: 148.38 LBS

## 2024-02-20 DIAGNOSIS — E78.2 MIXED HYPERLIPIDEMIA: ICD-10-CM

## 2024-02-20 DIAGNOSIS — R00.1 BRADYCARDIA: ICD-10-CM

## 2024-02-20 DIAGNOSIS — R01.1 HEART MURMUR: ICD-10-CM

## 2024-02-20 DIAGNOSIS — I50.32 CHRONIC HEART FAILURE WITH PRESERVED EJECTION FRACTION: Primary | ICD-10-CM

## 2024-02-20 DIAGNOSIS — Z85.46 HISTORY OF PROSTATE CANCER: ICD-10-CM

## 2024-02-20 DIAGNOSIS — J45.909 ASTHMA, UNSPECIFIED ASTHMA SEVERITY, UNSPECIFIED WHETHER COMPLICATED, UNSPECIFIED WHETHER PERSISTENT: ICD-10-CM

## 2024-02-20 DIAGNOSIS — R00.2 PALPITATIONS: ICD-10-CM

## 2024-02-20 DIAGNOSIS — I27.20 PULMONARY HTN: ICD-10-CM

## 2024-02-20 DIAGNOSIS — I10 PRIMARY HYPERTENSION: ICD-10-CM

## 2024-02-20 DIAGNOSIS — Z87.891 FORMER SMOKER: ICD-10-CM

## 2024-02-20 DIAGNOSIS — I49.9 IRREGULAR HEART BEATS: ICD-10-CM

## 2024-02-20 DIAGNOSIS — M10.9 ACUTE GOUT OF RIGHT FOOT, UNSPECIFIED CAUSE: ICD-10-CM

## 2024-02-20 DIAGNOSIS — D64.9 MILD ANEMIA: ICD-10-CM

## 2024-02-20 LAB
CRP SERPL-MCNC: 4.3 MG/L (ref 0–8.2)
URATE SERPL-MCNC: 8.1 MG/DL (ref 3.4–7)

## 2024-02-20 PROCEDURE — G2211 COMPLEX E/M VISIT ADD ON: HCPCS | Mod: S$GLB,,, | Performed by: INTERNAL MEDICINE

## 2024-02-20 PROCEDURE — 99999 PR PBB SHADOW E&M-EST. PATIENT-LVL V: CPT | Mod: PBBFAC,,, | Performed by: INTERNAL MEDICINE

## 2024-02-20 PROCEDURE — 85652 RBC SED RATE AUTOMATED: CPT | Performed by: PODIATRIST

## 2024-02-20 PROCEDURE — 36415 COLL VENOUS BLD VENIPUNCTURE: CPT | Performed by: PODIATRIST

## 2024-02-20 PROCEDURE — 84550 ASSAY OF BLOOD/URIC ACID: CPT | Performed by: PODIATRIST

## 2024-02-20 PROCEDURE — 99214 OFFICE O/P EST MOD 30 MIN: CPT | Mod: S$GLB,,, | Performed by: INTERNAL MEDICINE

## 2024-02-20 PROCEDURE — 86140 C-REACTIVE PROTEIN: CPT | Performed by: PODIATRIST

## 2024-02-20 RX ORDER — VALSARTAN AND HYDROCHLOROTHIAZIDE 320; 25 MG/1; MG/1
1 TABLET, FILM COATED ORAL DAILY
Qty: 90 TABLET | Refills: 1 | Status: SHIPPED | OUTPATIENT
Start: 2024-02-20 | End: 2025-02-19

## 2024-02-21 LAB — ERYTHROCYTE [SEDIMENTATION RATE] IN BLOOD BY PHOTOMETRIC METHOD: 96 MM/HR (ref 0–23)

## 2024-02-28 ENCOUNTER — OFFICE VISIT (OUTPATIENT)
Dept: HEMATOLOGY/ONCOLOGY | Facility: CLINIC | Age: 69
End: 2024-02-28
Payer: MEDICARE

## 2024-02-28 VITALS
WEIGHT: 148.56 LBS | RESPIRATION RATE: 18 BRPM | HEIGHT: 65 IN | BODY MASS INDEX: 24.75 KG/M2 | SYSTOLIC BLOOD PRESSURE: 145 MMHG | TEMPERATURE: 98 F | HEART RATE: 60 BPM | OXYGEN SATURATION: 97 % | DIASTOLIC BLOOD PRESSURE: 75 MMHG

## 2024-02-28 DIAGNOSIS — E88.09 PLASMA CELL DYSCRASIA: Chronic | ICD-10-CM

## 2024-02-28 DIAGNOSIS — R77.9 ABNORMALITY OF PLASMA PROTEIN: Primary | Chronic | ICD-10-CM

## 2024-02-28 DIAGNOSIS — D47.2 MONOCLONAL GAMMOPATHY: Chronic | ICD-10-CM

## 2024-02-28 DIAGNOSIS — K22.89 ESOPHAGEAL MASS: Chronic | ICD-10-CM

## 2024-02-28 PROCEDURE — 99204 OFFICE O/P NEW MOD 45 MIN: CPT | Mod: S$GLB,,, | Performed by: INTERNAL MEDICINE

## 2024-02-28 PROCEDURE — 99999 PR PBB SHADOW E&M-EST. PATIENT-LVL IV: CPT | Mod: PBBFAC,,, | Performed by: INTERNAL MEDICINE

## 2024-02-28 NOTE — PROGRESS NOTES
[facility]  [address] [facility] [address]  Phone: [phone];  Fax: [phone]    Patient ID: Musa Campos   Chief Complaint: Establish Care  MRN:  [id number]     Oncologic Diagnosis:  Esophageal Mass  Hematologic Diagnosis:  Paraproteinemia  Current Treatment:  N/A  Subjective   Musa Campos is a 68 y.o. male with HTN, COPD, Pulmonary Hypertension, HFpEF, History of Prostate Cancer, Lung Nodules and Gout who presents to clinic to [facility] and is accompanied by her daughter.    They report that he has had some changes in his health.  He has lost 13 lbs in the last 1-2 months without change in appetite and has had some fatigue.  He has no acute complaints.  We reviewed his current imaging and lab results and my suspicions regarding both.  His blood work is most notably suspicious for a paraproteinemia and his CT chest showed a questionable esophageal mass.  The myeloma spectrum of disorders was explained in detail.    He is not having any difficulty swallowing, sore throat or pain with swallowing.  They expressed understanding of the plan and all questions were answered to their satisfaction.    His social history is notable for that he smokes marijuana daily; former smoker 55 pack years (quit 3-4 years ago); former heavy alcohol use (quit 02/14/1998).    Review of Systems:  Review of Systems   Constitutional:  Positive for fatigue and unexpected weight change. Negative for activity change, appetite change, chills, diaphoresis and fever.   HENT:  Negative for nosebleeds, trouble swallowing and voice change.    Respiratory:  Negative for shortness of breath.    Cardiovascular:  Negative for chest pain.   Gastrointestinal:  Negative for abdominal distention, abdominal pain, anal bleeding, blood in stool, constipation, diarrhea, nausea and vomiting.   Genitourinary:  Negative for difficulty urinating and hematuria.   Musculoskeletal:  Positive for  "myalgias (only with gout flares). Negative for arthralgias and back pain.   Skin:  Negative for rash.   Neurological:  Negative for dizziness, weakness, light-headedness and headaches.   Hematological:  Does not bruise/bleed easily.   Psychiatric/Behavioral:  The patient is not nervous/anxious.      History     Past Medical History:   Diagnosis Date    Abnormality of plasma protein 02/04/2023    Hyperuricemia 02/03/2024    Monoclonal gammopathy 02/03/2024    Primary hypertension 01/25/2023       Past Surgical History:   Procedure Laterality Date    CATARACT EXTRACTION Left 07/26/2023    PROSTATE SURGERY         Family History   Problem Relation Age of Onset    No Known Problems Mother     No Known Problems Father        Review of patient's allergies indicates:  No Known Allergies    Social History     Tobacco Use    Smoking status: Former     Current packs/day: 0.00     Average packs/day: 1 pack/day for 55.0 years (55.0 ttl pk-yrs)     Types: Cigarettes     Start date: 1966     Quit date: 2021     Years since quitting: 3.1    Smokeless tobacco: Former    Tobacco comments:     Pt quit smoking about 2 years ago.   Substance Use Topics    Alcohol use: Not Currently    Drug use: Yes     Types: Marijuana       Physical Exam   ECOG:   ECOG SCORE    0 - Fully active-able to carry on all pre-disease performance without restriction          Vitals:  BP (!) 145/75   Pulse 60   Temp 98.2 °F (36.8 °C)   Resp 18   Ht 5' 5" (1.651 m)   Wt 67.4 kg (148 lb 9.4 oz)   SpO2 97%   BMI 24.73 kg/m²     Physical Exam:  Physical Exam  Constitutional:       General: He is not in acute distress.     Appearance: Normal appearance. He is normal weight. He is not ill-appearing or toxic-appearing.   HENT:      Head: Normocephalic and atraumatic.   Eyes:      Extraocular Movements: Extraocular movements intact.      Conjunctiva/sclera: Conjunctivae normal.   Cardiovascular:      Rate and Rhythm: Normal rate.   Pulmonary:      Effort: " Pulmonary effort is normal. No respiratory distress.   Abdominal:      Palpations: There is no hepatomegaly or splenomegaly.   Skin:     General: Skin is warm.      Findings: No bruising.   Neurological:      General: No focal deficit present.      Mental Status: He is alert and oriented to person, place, and time. Mental status is at baseline.   Psychiatric:         Mood and Affect: Mood normal.         Behavior: Behavior normal.         Thought Content: Thought content normal.      Labs   Labs:  No visits with results within 2 Day(s) from this visit.   Latest known visit with results is:   Lab Visit on 02/20/2024   Component Date Value Ref Range Status    Sed Rate 02/20/2024 96 (H)  0 - 23 mm/Hr Final    CRP 02/20/2024 4.3  0.0 - 8.2 mg/L Final    Uric Acid 02/20/2024 8.1 (H)  3.4 - 7.0 mg/dL Final        Imaging   CT Chest Without Contrast - 02/08/2024   Impression:  1. Abnormal appearance of the esophagus with concern for mass lesion at the level of the roslyn.  Further evaluation recommended.  2. No detrimental change in bilateral lung nodules.  See description above.  This report was flagged in Epic as abnormal.     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 03/14/24 or 03/15/24   Follow up with [facility]    Infusion scheduling note     Injection scheduling note    Labs UPEP   Scheduling:  Preferred lab:  Lab interval:     Imaging    Pharmacy appointment    Other referrals       Additional referrals needed  Bone marrow biopsy             The patient was seen, interviewed and examined. Pertinent lab and radiologic studies were reviewed. Pt instructed to call should they develop concerning signs/symptoms or have further questions.        Portions of the record may have been created with voice recognition software. Occasional wrong-word or sound-a-like substitutions may have occurred due to the inherent limitations of voice recognition software. Read the chart carefully and recognize, using context, where substitutions have occurred.      Marcy Coates MD    Hematology/Oncology

## 2024-03-02 NOTE — PROGRESS NOTES
Saw cardiology 2/20/24.  Future Appointments:  3/13/2024  8:30 AM    EXERCISETREADMCHRISTINE, VC    [facility] SPECCPR        [place]  5/22/2024  11:20 AM   Ajay Barahona MD           [facility] CARDIO         [facility]

## 2024-03-04 ENCOUNTER — OFFICE VISIT (OUTPATIENT)
Dept: PODIATRY | Facility: CLINIC | Age: 69
End: 2024-03-04
Payer: MEDICARE

## 2024-03-04 ENCOUNTER — ANESTHESIA EVENT (OUTPATIENT)
Dept: ENDOSCOPY | Facility: HOSPITAL | Age: 69
End: 2024-03-04
Payer: MEDICARE

## 2024-03-04 ENCOUNTER — OFFICE VISIT (OUTPATIENT)
Dept: OPHTHALMOLOGY | Facility: CLINIC | Age: 69
End: 2024-03-04
Payer: MEDICARE

## 2024-03-04 VITALS — BODY MASS INDEX: 24.75 KG/M2 | HEIGHT: 65 IN | WEIGHT: 148.56 LBS

## 2024-03-04 DIAGNOSIS — M25.571 PAIN IN JOINT OF RIGHT FOOT: ICD-10-CM

## 2024-03-04 DIAGNOSIS — H04.123 DRY EYE SYNDROME, BILATERAL: ICD-10-CM

## 2024-03-04 DIAGNOSIS — H21.541 POSTERIOR SYNECHIAE (IRIS), RIGHT EYE: ICD-10-CM

## 2024-03-04 DIAGNOSIS — Z96.1 PSEUDOPHAKIA OF LEFT EYE: Primary | ICD-10-CM

## 2024-03-04 DIAGNOSIS — M10.9 ACUTE GOUT OF RIGHT FOOT, UNSPECIFIED CAUSE: Primary | ICD-10-CM

## 2024-03-04 PROCEDURE — 99999 PR PBB SHADOW E&M-EST. PATIENT-LVL III: CPT | Mod: PBBFAC,,, | Performed by: PODIATRIST

## 2024-03-04 PROCEDURE — 99213 OFFICE O/P EST LOW 20 MIN: CPT | Mod: S$GLB,,, | Performed by: OPTOMETRIST

## 2024-03-04 PROCEDURE — 99213 OFFICE O/P EST LOW 20 MIN: CPT | Mod: S$GLB,,, | Performed by: PODIATRIST

## 2024-03-04 PROCEDURE — 99999 PR PBB SHADOW E&M-EST. PATIENT-LVL II: CPT | Mod: PBBFAC,,, | Performed by: OPTOMETRIST

## 2024-03-04 RX ORDER — METHYLPREDNISOLONE 4 MG/1
4 TABLET ORAL DAILY
Qty: 21 TABLET | Refills: 0 | Status: SHIPPED | OUTPATIENT
Start: 2024-03-04

## 2024-03-04 RX ORDER — COLCHICINE 0.6 MG/1
0.6 TABLET ORAL DAILY
Qty: 6 TABLET | Refills: 0 | Status: SHIPPED | OUTPATIENT
Start: 2024-03-04 | End: 2024-03-10

## 2024-03-04 RX ORDER — ALLOPURINOL 100 MG/1
100 TABLET ORAL DAILY
Qty: 30 TABLET | Refills: 1 | Status: SHIPPED | OUTPATIENT
Start: 2024-03-04

## 2024-03-04 NOTE — PROGRESS NOTES
HPI     Blurred Vision            Comments: Pt co OS being very blurred and fluctuating a lot going from   clear to blurred  No pain  Otc gtts prn ou  No fol  No floaters           Comments    Pt of DKT    Cataracts OD  PCIOL OS 7/26/23 (+17.0 SY60WF)             Last edited by Srinivas Barr on 3/4/2024  9:49 AM.            Assessment /Plan     For exam results, see Encounter Report.    1. Pseudophakia of left eye  S/p CE/IOL with Dr GUERRA, doing well. Continue Mrx full time. Monocular precautions reviewed.     2. Dry eye syndrome, bilateral  Ivizia discussed 1gtt qid. Warm compress/lid hygiene reviewed.     3. Posterior synechiae (iris), right eye  Has seen Dr CPG, observe at this time.      RTC 6 months for DFE, sooner if any changes to vision or worsening symptoms.

## 2024-03-04 NOTE — PROGRESS NOTES
Subjective:     Patient ID: Musa JOHNSON [place] is a 68 y.o. male.    Chief Complaint: Follow-up (Follow up Gout on right foot, pain in right hallux , rates pain 6/10, wearing a walker shoe on right and tennis shoe on left. Non-diabetic pt, last seen PCP Dr. Krishna 2/14/24.)    Musa is a 68 y.o. male who presents to the podiatry clinic for follow up of right foot gout. Patient states he completed gout medication as prescribed. Patient states swelling and pain is better. Patient rates pain 6/10. Patient has no other pedal complaints at this time.     Patient Active Problem List   Diagnosis    Primary hypertension    Former smoker    Excessive cerumen in ear canal, bilateral    Presbycusis of both ears    Abnormality of plasma protein    Mixed hyperlipidemia    Mild anemia    History of prostate cancer    Asthma    Lung nodule, multiple    COPD - chronic bronchitis    Palpitations    Hyperuricemia    Monoclonal gammopathy    Esophageal mass    Pulmonary hypertension    Chronic heart failure with preserved ejection fraction    Plasma cell dyscrasia - Rule out smoldering multiple myeloma       Medication List with Changes/Refills   Current Medications    ALBUTEROL (PROVENTIL/VENTOLIN HFA) 90 MCG/ACTUATION INHALER    Inhale 2 puffs into the lungs every 6 (six) hours as needed for Wheezing. Rescue    ATORVASTATIN (LIPITOR) 80 MG TABLET    Take 1 tablet (80 mg total) by mouth once daily.    FLUTICASONE-SALMETEROL DISKUS INHALER 250-50 MCG    Inhale 1 puff into the lungs 2 (two) times daily. Controller    GATIFLOXACIN 0.5 % DROP DROPS    Place 1 drop into the left eye 2 (two) times daily. Eyedrops to start one day before surgery    KETOROLAC 0.5% (ACULAR) 0.5 % DROP    Place 1 drop into the left eye 4 (four) times daily. Eyedrops to start one day before surgery    NAPROXEN (NAPROSYN) 500 MG TABLET    Take 1 tablet (500 mg total) by mouth 2 (two) times daily as needed (for pain in joints).    OMEGA-3 FATTY ACIDS-FISH  -1,000 MG CAP    Take 1 capsule by mouth once daily.    PREDNISOLONE ACETATE (PRED FORTE) 1 % DRPS    Place 1 drop into the left eye 4 (four) times daily. Start one day before surgery    VALSARTAN-HYDROCHLOROTHIAZIDE (DIOVAN-HCT) 320-25 MG PER TABLET    Take 1 tablet by mouth once daily.   Changed and/or Refilled Medications    Modified Medication Previous Medication    ALLOPURINOL (ZYLOPRIM) 100 MG TABLET allopurinoL (ZYLOPRIM) 100 MG tablet       Take 1 tablet (100 mg total) by mouth once daily.    Take 1 tablet (100 mg total) by mouth once daily.    COLCHICINE (COLCRYS) 0.6 MG TABLET colchicine (COLCRYS) 0.6 mg tablet       Take 1 tablet (0.6 mg total) by mouth once daily. Take one tablet daily. for 6 days    Take 1 tablet (0.6 mg total) by mouth once daily. Take one tablet daily. for 6 days    METHYLPREDNISOLONE (MEDROL DOSEPACK) 4 MG TABLET methylPREDNISolone (MEDROL DOSEPACK) 4 mg tablet       Take 1 tablet (4 mg total) by mouth once daily. Use as instructed on dose pack    Take 1 tablet (4 mg total) by mouth once daily. Use as instructed on dose pack       Review of patient's allergies indicates:  No Known Allergies    Past Surgical History:   Procedure Laterality Date    CATARACT EXTRACTION Left 07/26/2023    ESOPHAGOGASTRODUODENOSCOPY N/A 3/6/2024    Procedure: EGD (ESOPHAGOGASTRODUODENOSCOPY);  Surgeon: Marcie Mcduffie MD;  [facility];  Service: Gastroenterology;  Laterality: N/A;    PROSTATE SURGERY         Family History   Problem Relation Age of Onset    No Known Problems Mother     No Known Problems Father        Social History     Socioeconomic History    Marital status:    Tobacco Use    Smoking status: Former     Current packs/day: 0.00     Average packs/day: 1 pack/day for 55.0 years (55.0 ttl pk-yrs)     Types: Cigarettes     Start date: 1966     Quit date: 2021     Years since quitting: 3.1    Smokeless tobacco: Former    Tobacco comments:     Pt quit smoking about 2  "years ago.   Substance and Sexual Activity    Alcohol use: Not Currently    Drug use: Yes     Types: Marijuana    Sexual activity: Not Currently     Partners: Female     Social Determinants of Health     Financial Resource Strain: Low Risk  (2/14/2024)    Overall Financial Resource Strain (CARDIA)     Difficulty of Paying Living Expenses: Not hard at all   Food Insecurity: No Food Insecurity (2/14/2024)    Hunger Vital Sign     Worried About Running Out of Food in the Last Year: Never true     Ran Out of Food in the Last Year: Never true   Transportation Needs: No Transportation Needs (2/14/2024)    PRAPARE - Transportation     Lack of Transportation (Medical): No     Lack of Transportation (Non-Medical): No   Physical Activity: Insufficiently Active (2/14/2024)    Exercise Vital Sign     Days of Exercise per Week: 2 days     Minutes of Exercise per Session: 20 min   Stress: No Stress Concern Present (2/14/2024)    [employer] - Occupational Stress Questionnaire     Feeling of Stress : Not at all   Social Connections: Unknown (2/14/2024)    Social Connection and Isolation Panel [NHANES]     Frequency of Communication with Friends and Family: Twice a week     Frequency of Social Gatherings with Friends and Family: Once a week     Active Member of Clubs or Organizations: No     Attends Club or Organization Meetings: Never     Marital Status:    Housing Stability: Unknown (2/14/2024)    Housing Stability Vital Sign     Unable to Pay for Housing in the Last Year: No     Unstable Housing in the Last Year: No       Vitals:    03/04/24 1138   Weight: 67.4 kg (148 lb 9.4 oz)   Height: 5' 5" (1.651 m)   PainSc:   6       Hemoglobin A1C   Date Value Ref Range Status   01/25/2023 5.6 4.0 - 5.6 % Final     Comment:     ADA Screening Guidelines:  5.7-6.4%  Consistent with prediabetes  >or=6.5%  Consistent with diabetes    High levels of fetal hemoglobin interfere with the HbA1C  assay. " Heterozygous hemoglobin variants (HbS, HgC, etc)do  not significantly interfere with this assay.   However, presence of multiple variants may affect accuracy.         Review of Systems   Constitutional:  Negative for chills and fever.   Respiratory:  Negative for shortness of breath.    Cardiovascular:  Negative for chest pain, palpitations, orthopnea, claudication and leg swelling.   Gastrointestinal:  Negative for diarrhea, nausea and vomiting.   Musculoskeletal:  Negative for joint pain.   Skin:  Negative for rash.   Neurological:  Negative for dizziness, tingling, sensory change, focal weakness and weakness.   Psychiatric/Behavioral: Negative.               Objective:      PHYSICAL EXAM: Apperance: Alert and orient in no distress,well developed, and with good attention to grooming and body habits  Patient presents ambulating in post op shoe on right and tennis shoe on left.   LOWER EXTREMITIES EXAM:   VASCULAR: Dorsalis pedis pulses 2/4 right and Posterior Tibial pulses 2/4 right.   DERMATOLOGICAL: No skin rashes, subcutaneous nodules, lesions, or ulcers observed right. (+) decreased edema, (--) erythema, (+) increased temperature noted to right 1st MPJ. Webspaces 1,2,3,4 clean, dry and without evidence of break in skin integrity right.   NEUROLOGICAL: Light touch, sharp-dull, proprioception all present and equal bilaterally.  MUSCULOSKELETAL: Muscle strength is 5/5 for foot inverters, everters, plantarflexors, and dorsiflexors. Muscle tone is normal. Positive pain on palpation of right 1st MPJ    TEST RESULTS: Uric acid results reveals positive gout 8.8 and 8.1.     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  Remaining joint spaces are well-preserved.           Assessment:       ICD-10-CM ICD-9-CM   1. Acute gout of right foot, unspecified cause - Right Foot  M10.9 274.01   2. Pain in joint of right foot  M25.571  719.47       Plan:   Acute gout of right foot, unspecified cause - Right Foot  -     allopurinoL (ZYLOPRIM) 100 MG tablet; Take 1 tablet (100 mg total) by mouth once daily.  Dispense: 30 tablet; Refill: 1  -     methylPREDNISolone (MEDROL DOSEPACK) 4 mg tablet; Take 1 tablet (4 mg total) by mouth once daily. Use as instructed on dose pack  Dispense: 21 tablet; Refill: 0  -     colchicine (COLCRYS) 0.6 mg tablet; Take 1 tablet (0.6 mg total) by mouth once daily. Take one tablet daily. for 6 days  Dispense: 6 tablet; Refill: 0    Pain in joint of right foot  -     Sedimentation rate; Future; Expected date: 03/04/2024  -     C-Reactive Protein; Future; Expected date: 03/04/2024  -     Uric Acid; Future; Expected date: 03/04/2024  -     allopurinoL (ZYLOPRIM) 100 MG tablet; Take 1 tablet (100 mg total) by mouth once daily.  Dispense: 30 tablet; Refill: 1  -     methylPREDNISolone (MEDROL DOSEPACK) 4 mg tablet; Take 1 tablet (4 mg total) by mouth once daily. Use as instructed on dose pack  Dispense: 21 tablet; Refill: 0  -     colchicine (COLCRYS) 0.6 mg tablet; Take 1 tablet (0.6 mg total) by mouth once daily. Take one tablet daily. for 6 days  Dispense: 6 tablet; Refill: 0      I counseled the patient on his conditions, regarding findings of my examination, my impressions, and usual treatment plan.   I explained to the patient that etiologies and treatment options for gout including rest, elevation, diet control, NSAID's, long term gout medication, and/or injection therapy.    Prescription written for Colchicine 0.6mg to be taken as prescribed.  Prescription written for Medrol Dosepak to be taken as directed on package. Discussed possible increase in blood sugar with taking steroid medication. Patient advised on the possible elevation of blood pressure sugar and caution to take pills as prescribed and to discontinue use if symptoms arise, patient agreed.  Prescription written for Allopurinol 100mg to be taken as  prescribed.   Ordered uric acid, crp, and esr testing to be completed in 4-6 weeks.   Patient to return in 1-2 months or sooner if needed.          Maria Isabel Pappas DPM  [facility]

## 2024-03-05 ENCOUNTER — HOSPITAL ENCOUNTER (OUTPATIENT)
Dept: RADIOLOGY | Facility: HOSPITAL | Age: 69
Discharge: HOME | End: 2024-03-05
Attending: INTERNAL MEDICINE
Payer: MEDICARE

## 2024-03-05 DIAGNOSIS — D47.2 SMOLDERING MULTIPLE MYELOMA: ICD-10-CM

## 2024-03-05 PROCEDURE — 78816 PET IMAGE W/CT FULL BODY: CPT | Mod: 26,PI,, | Performed by: RADIOLOGY

## 2024-03-05 PROCEDURE — A9552 F18 FDG: HCPCS | Performed by: INTERNAL MEDICINE

## 2024-03-05 PROCEDURE — 78816 PET IMAGE W/CT FULL BODY: CPT | Mod: TC

## 2024-03-05 RX ORDER — FLUDEOXYGLUCOSE F18 500 MCI/ML
13.04 INJECTION INTRAVENOUS
Status: COMPLETED | OUTPATIENT
Start: 2024-03-05 | End: 2024-03-05

## 2024-03-05 RX ADMIN — FLUDEOXYGLUCOSE F-18 13.04 MILLICURIE: 500 INJECTION INTRAVENOUS at 09:03

## 2024-03-05 NOTE — ANESTHESIA PREPROCEDURE EVALUATION
03/05/2024  Musa [place]ang[place] is a 68 y.o., male.    Past Medical History:   Diagnosis Date    Abnormality of plasma protein 02/04/2023    Hyperuricemia 02/03/2024    Monoclonal gammopathy 02/03/2024    Primary hypertension 01/25/2023     Past Surgical History:   Procedure Laterality Date    CATARACT EXTRACTION Left 07/26/2023    PROSTATE SURGERY       Patient Active Problem List   Diagnosis    Primary hypertension    Former smoker    Excessive cerumen in ear canal, bilateral    Presbycusis of both ears    Abnormality of plasma protein    Mixed hyperlipidemia    Mild anemia    History of prostate cancer    Asthma    Lung nodule, multiple    COPD - chronic bronchitis    Palpitations    Hyperuricemia    Monoclonal gammopathy    Esophageal mass    Pulmonary hypertension    Chronic heart failure with preserved ejection fraction    Plasma cell dyscrasia - Rule out smoldering multiple myeloma     Normal sinus rhythm   Normal ECG   When compared with ECG of 25-JAN-2023 12:34,   No significant change was found   Confirmed by DEE DEE KEANE MD ([id number]) on 1/26/2024     Summary 2/8/24       Left Ventricle: The left ventricle is normal in size. Normal wall thickness. Normal wall motion. There is normal systolic function with a visually estimated ejection fraction of 55 - 60%. Grade II diastolic dysfunction.    Right Ventricle: Normal right ventricular cavity size. Wall thickness is normal. Right ventricle wall motion  is normal. Systolic function is normal.    Aortic Valve: There is moderate aortic valve sclerosis. Mildly calcified cusps. There is mild annular calcification present.    Mitral Valve: Mildly thickened leaflets. Mildly calcified leaflets. There is mild regurgitation.    Tricuspid Valve: There is mild regurgitation.    Pulmonary Artery: The estimated pulmonary artery systolic pressure is 52 mmHg.    IVC/SVC:  Elevated venous pressure at 15 mmHg.  Pre-op Assessment    I have reviewed the Patient Summary Reports.     I have reviewed the Nursing Notes. I have reviewed the NPO Status.   I have reviewed the Medications.     Review of Systems  Anesthesia Hx:  No problems with previous Anesthesia             Denies Family Hx of Anesthesia complications.    Denies Personal Hx of Anesthesia complications.                    Social:  No Alcohol Use, Smoker, Recreational Drugs       Cardiovascular:     Hypertension           hyperlipidemia   ECG has been reviewed. NSR                         Pulmonary:   COPD Asthma                    Renal/:  Renal/ Normal                 Hepatic/GI:        Esophageal mass           Musculoskeletal:  Musculoskeletal Normal                Neurological:  Neurology Normal                                      Endocrine:  Endocrine Normal            Dermatological:  Skin Normal    Psych:  Psychiatric Normal                    Physical Exam  General: Well nourished, Cooperative, Alert and Oriented    Airway:  Mallampati: II   Mouth Opening: Normal  TM Distance: Normal  Tongue: Normal  Neck ROM: Normal ROM    Dental:  Edentulous    Chest/Lungs:  Normal Respiratory Rate        Anesthesia Plan  Type of Anesthesia, risks & benefits discussed:    Anesthesia Type: Gen Natural Airway  Intra-op Monitoring Plan: Standard ASA Monitors  Post Op Pain Control Plan: multimodal analgesia  Induction:  IV  Informed Consent: Informed consent signed with the Patient and all parties understand the risks and agree with anesthesia plan.  All questions answered.   ASA Score: 2  Day of Surgery Review of History & Physical: H&P Update referred to the surgeon/provider.  Anesthesia Plan Notes: Blind in one eye    Ready For Surgery From Anesthesia Perspective.     .

## 2024-03-06 ENCOUNTER — HOSPITAL ENCOUNTER (OUTPATIENT)
Facility: HOSPITAL | Age: 69
Discharge: HOME | End: 2024-03-06
Attending: INTERNAL MEDICINE | Admitting: INTERNAL MEDICINE
Payer: MEDICARE

## 2024-03-06 ENCOUNTER — ANESTHESIA (OUTPATIENT)
Dept: ENDOSCOPY | Facility: HOSPITAL | Age: 69
End: 2024-03-06
Payer: MEDICARE

## 2024-03-06 DIAGNOSIS — R93.5 ABNORMAL CT OF THE ABDOMEN: ICD-10-CM

## 2024-03-06 PROCEDURE — 88305 TISSUE EXAM BY PATHOLOGIST: CPT | Mod: 26,,, | Performed by: PATHOLOGY

## 2024-03-06 PROCEDURE — 88342 IMHCHEM/IMCYTCHM 1ST ANTB: CPT | Mod: 26,,, | Performed by: PATHOLOGY

## 2024-03-06 PROCEDURE — 37000008 HC ANESTHESIA 1ST 15 MINUTES: Performed by: INTERNAL MEDICINE

## 2024-03-06 PROCEDURE — 88342 IMHCHEM/IMCYTCHM 1ST ANTB: CPT | Performed by: PATHOLOGY

## 2024-03-06 PROCEDURE — 63600175 PHARM REV CODE 636 W HCPCS: Performed by: INTERNAL MEDICINE

## 2024-03-06 PROCEDURE — 27201012 HC FORCEPS, HOT/COLD, DISP: Performed by: INTERNAL MEDICINE

## 2024-03-06 PROCEDURE — 37000009 HC ANESTHESIA EA ADD 15 MINS: Performed by: INTERNAL MEDICINE

## 2024-03-06 PROCEDURE — D9220A PRA ANESTHESIA: Mod: CRNA,,, | Performed by: NURSE ANESTHETIST, CERTIFIED REGISTERED

## 2024-03-06 PROCEDURE — D9220A PRA ANESTHESIA: Mod: ANES,,, | Performed by: ANESTHESIOLOGY

## 2024-03-06 PROCEDURE — 43239 EGD BIOPSY SINGLE/MULTIPLE: CPT | Performed by: INTERNAL MEDICINE

## 2024-03-06 PROCEDURE — 25000003 PHARM REV CODE 250: Performed by: NURSE ANESTHETIST, CERTIFIED REGISTERED

## 2024-03-06 PROCEDURE — 63600175 PHARM REV CODE 636 W HCPCS: Performed by: NURSE ANESTHETIST, CERTIFIED REGISTERED

## 2024-03-06 PROCEDURE — 43239 EGD BIOPSY SINGLE/MULTIPLE: CPT | Mod: ,,, | Performed by: INTERNAL MEDICINE

## 2024-03-06 PROCEDURE — 88305 TISSUE EXAM BY PATHOLOGIST: CPT | Performed by: PATHOLOGY

## 2024-03-06 RX ORDER — SODIUM CHLORIDE, SODIUM LACTATE, POTASSIUM CHLORIDE, CALCIUM CHLORIDE 600; 310; 30; 20 MG/100ML; MG/100ML; MG/100ML; MG/100ML
INJECTION, SOLUTION INTRAVENOUS CONTINUOUS
Status: DISCONTINUED | OUTPATIENT
Start: 2024-03-06 | End: 2024-03-06 | Stop reason: HOSPADM

## 2024-03-06 RX ORDER — PROPOFOL 10 MG/ML
VIAL (ML) INTRAVENOUS
Status: DISCONTINUED | OUTPATIENT
Start: 2024-03-06 | End: 2024-03-06

## 2024-03-06 RX ORDER — LIDOCAINE HYDROCHLORIDE 20 MG/ML
INJECTION INTRAVENOUS
Status: DISCONTINUED | OUTPATIENT
Start: 2024-03-06 | End: 2024-03-06

## 2024-03-06 RX ADMIN — PROPOFOL 100 MG: 10 INJECTION, EMULSION INTRAVENOUS at 09:03

## 2024-03-06 RX ADMIN — LIDOCAINE HYDROCHLORIDE 50 MG: 20 INJECTION INTRAVENOUS at 09:03

## 2024-03-06 RX ADMIN — SODIUM CHLORIDE, SODIUM LACTATE, POTASSIUM CHLORIDE, AND CALCIUM CHLORIDE: 600; 310; 30; 20 INJECTION, SOLUTION INTRAVENOUS at 09:03

## 2024-03-06 NOTE — H&P
PRE PROCEDURE H&P    Patient Name: Musa Jangchester  MRN: [id number]  : 1955  Date of Procedure:  3/6/2024  Referring Physician: KONSTANTIN Krishna MD  Primary Physician: KONSTANTIN Krishna MD  Procedure Physician: Marcie Mcduffie MD       Planned Procedure: EGD  Diagnosis: abnormal imaging  Chief Complaint: Same as above    HPI: Patient is an 68 y.o. male is here for the above.       Anticoagulation: None     Past Medical History:   Past Medical History:   Diagnosis Date    Abnormality of plasma protein 2023    Hyperuricemia 2024    Monoclonal gammopathy 2024    Primary hypertension 2023        Past Surgical History:  Past Surgical History:   Procedure Laterality Date    CATARACT EXTRACTION Left 2023    PROSTATE SURGERY          Home Medications:  Prior to Admission medications    Medication Sig Start Date End Date Taking? Authorizing Provider   atorvastatin (LIPITOR) 80 MG tablet Take 1 tablet (80 mg total) by mouth once daily. 24 Yes KONSTANTIN Krishna MD   colchicine (COLCRYS) 0.6 mg tablet Take 1 tablet (0.6 mg total) by mouth once daily. Take one tablet daily. for 6 days 3/4/24 3/10/24 Yes Maria Isabel Pappas DPM   valsartan-hydrochlorothiazide (DIOVAN-HCT) 320-25 mg per tablet Take 1 tablet by mouth once daily. 24 Yes Ajay Barahona MD   albuterol (PROVENTIL/VENTOLIN HFA) 90 mcg/actuation inhaler Inhale 2 puffs into the lungs every 6 (six) hours as needed for Wheezing. Rescue 10/31/23 10/30/24  Ghassan Howard MD   allopurinoL (ZYLOPRIM) 100 MG tablet Take 1 tablet (100 mg total) by mouth once daily. 3/4/24   Maria Isabel Pappas DPM   fluticasone-salmeterol diskus inhaler 250-50 mcg Inhale 1 puff into the lungs 2 (two) times daily. Controller 3/29/23 3/28/24  Susy Chacon MD   gatifloxacin 0.5 % Drop drops Place 1 drop into the left eye 2 (two) times daily. Eyedrops to start one day before surgery 7/10/23   Joshua Lorenz, ANCELMO    ketorolac 0.5% (ACULAR) 0.5 % Drop Place 1 drop into the left eye 4 (four) times daily. Eyedrops to start one day before surgery 6/29/23   Se Arias MD   methylPREDNISolone (MEDROL DOSEPACK) 4 mg tablet Take 1 tablet (4 mg total) by mouth once daily. Use as instructed on dose pack 3/4/24   Maria Isabel Pappas DPM   naproxen (NAPROSYN) 500 MG tablet Take 1 tablet (500 mg total) by mouth 2 (two) times daily as needed (for pain in joints). 1/26/24   KONSTANTIN Krishna MD   omega-3 fatty acids-fish oil 340-1,000 mg Cap Take 1 capsule by mouth once daily. 2/20/24 2/19/25  Ajay Barahona MD   prednisoLONE acetate (PRED FORTE) 1 % DrpS Place 1 drop into the left eye 4 (four) times daily. Start one day before surgery 6/29/23   Se Arias MD        Allergies:  Review of patient's allergies indicates:  No Known Allergies     Social History:   Social History     Socioeconomic History    Marital status:    Tobacco Use    Smoking status: Former     Current packs/day: 0.00     Average packs/day: 1 pack/day for 55.0 years (55.0 ttl pk-yrs)     Types: Cigarettes     Start date: 1966     Quit date: 2021     Years since quitting: 3.1    Smokeless tobacco: Former    Tobacco comments:     Pt quit smoking about 2 years ago.   Substance and Sexual Activity    Alcohol use: Not Currently    Drug use: Yes     Types: Marijuana    Sexual activity: Not Currently     Partners: Female     Social Determinants of Health     Financial Resource Strain: Low Risk  (2/14/2024)    Overall Financial Resource Strain (CARDIA)     Difficulty of Paying Living Expenses: Not hard at all   Food Insecurity: No Food Insecurity (2/14/2024)    Hunger Vital Sign     Worried About Running Out of Food in the Last Year: Never true     Ran Out of Food in the Last Year: Never true   Transportation Needs: No Transportation Needs (2/14/2024)    PRAPARE - Transportation     Lack of Transportation (Medical): No     Lack of Transportation  "(Non-Medical): No   Physical Activity: Insufficiently Active (2/14/2024)    Exercise Vital Sign     Days of Exercise per Week: 2 days     Minutes of Exercise per Session: 20 min   Stress: No Stress Concern Present (2/14/2024)    [employer] [place] [employer] - Occupational Stress Questionnaire     Feeling of Stress : Not at all   Social Connections: Unknown (2/14/2024)    Social Connection and Isolation Panel [NHANES]     Frequency of Communication with Friends and Family: Twice a week     Frequency of Social Gatherings with Friends and Family: Once a week     Active Member of Clubs or Organizations: No     Attends Club or Organization Meetings: Never     Marital Status:    Housing Stability: Unknown (2/14/2024)    Housing Stability Vital Sign     Unable to Pay for Housing in the Last Year: No     Unstable Housing in the Last Year: No       Family History:  Family History   Problem Relation Age of Onset    No Known Problems Mother     No Known Problems Father        ROS: No acute cardiac events, no acute respiratory complaints.     Physical Exam (all patients):    BP (!) 196/91 (BP Location: Right arm, Patient Position: Sitting)   Pulse 61   Temp 97.9 °F (36.6 °C) (Temporal)   Resp 17   Ht 5' 5" (1.651 m)   Wt 67.1 kg (147 lb 14.9 oz)   SpO2 95%   BMI 24.62 kg/m²   Lungs: Clear to auscultation bilaterally, respirations unlabored  Heart: Regular rate and rhythm, S1 and S2 normal, no obvious murmurs  Abdomen:         Soft, non-tender, bowel sounds normal, no masses, no organomegaly    Lab Results   Component Value Date    WBC 8.67 01/26/2024    MCV 90 01/26/2024    RDW 14.8 (H) 01/26/2024     01/26/2024    GLU 88 02/08/2024    HGBA1C 5.6 01/25/2023    BUN 16 02/08/2024     02/08/2024    K 4.7 02/08/2024     02/08/2024        SEDATION PLAN: per anesthesia      History reviewed, vital signs satisfactory, cardiopulmonary status satisfactory, sedation options, risks and plans " have been discussed with the patient  All their questions were answered and the patient agrees to the sedation procedures as planned and the patient is deemed an appropriate candidate for the sedation as planned.    Procedure explained to patient, informed consent obtained and placed in chart.    Marcie Mcduffie  3/6/2024

## 2024-03-06 NOTE — [employer] PATIENT INSTRUCTIONS
Discharge Summary/Instructions after an Endoscopic Procedure  Patient Name: Musa Campos  Patient MRN: [id number]  Patient YOB: 1955 Wednesday, March 6, 2024  Marcie Mcduffie MD  Dear patient,  As a result of recent federal legislation (The Federal Cures Act), you may   receive lab or pathology results from your procedure in your [facility]   account before your physician is able to contact you. Your physician or   their representative will relay the results to you with their   recommendations at their soonest availability.  Thank you,  RESTRICTIONS:  During your procedure today, you received medications for sedation.  These   medications may affect your judgment, balance and coordination.  Therefore,   for 24 hours, you have the following restrictions:   - DO NOT drive a car, operate machinery, make legal/financial decisions,   sign important papers or drink alcohol.    ACTIVITY:  Today: no heavy lifting, straining or running due to procedural   sedation/anesthesia.  The following day: return to full activity including work.  DIET:  Eat and drink normally unless instructed otherwise.     TREATMENT FOR COMMON SIDE EFFECTS:  - Mild abdominal pain, nausea, belching, bloating or excessive gas:  rest,   eat lightly and use a heating pad.  - Sore Throat: treat with throat lozenges and/or gargle with warm salt   water.  - Because air was used during the procedure, expelling large amounts of air   from your rectum or belching is normal.  - If a bowel prep was taken, you may not have a bowel movement for 1-3 days.    This is normal.  SYMPTOMS TO WATCH FOR AND REPORT TO YOUR PHYSICIAN:  1. Abdominal pain or bloating, other than gas cramps.  2. Chest pain.  3. Back pain.  4. Signs of infection such as: chills or fever occurring within 24 hours   after the procedure.  5. Rectal bleeding, which would show as bright red, maroon, or black stools.   (A tablespoon of blood from the rectum is not serious,  especially if   hemorrhoids are present.)  6. Vomiting.  7. Weakness or dizziness.  GO DIRECTLY TO THE NEAREST EMERGENCY ROOM IF YOU HAVE ANY OF THE FOLLOWING:      Difficulty breathing              Chills and/or fever over 101 F   Persistent vomiting and/or vomiting blood   Severe abdominal pain   Severe chest pain   Black, tarry stools   Bleeding- more than one tablespoon   Any other symptom or condition that you feel may need urgent attention  Your doctor recommends these additional instructions:  If any biopsies were taken, your doctors clinic will contact you in 1 to 2   weeks with any results.  - Discharge patient to home.   - Resume previous diet.   - Continue present medications.   - Consider evaluation for liver disease.   - Return to referring physician.  For questions, problems or results please call your physician Marcie Mcduffie MD at Work:  [phone]  If you have any questions about the above instructions, call the GI   department at [phone] or call the endoscopy unit at [phone]   from 7am until 3 pm.  [facility] [place], EMERGENCY ROOM PHONE NUMBER:   [phone]  IF A COMPLICATION OR EMERGENCY SITUATION ARISES AND YOU ARE UNABLE TO REACH   YOUR PHYSICIAN - GO DIRECTLY TO THE EMERGENCY ROOM.  I have read or have had read to me these discharge instructions for my   procedure and have received a written copy.  I understand these   instructions and will follow-up with my physician if I have any questions.     __________________________________       _____________________________________  Nurse Signature                                          Patient/Designated   Responsible Party Signature  Marcie Mcduffie MD  3/6/2024 9:51:03 AM  This report has been verified and signed electronically.  Dear patient,  As a result of recent federal legislation (The Federal Cures Act), you may   receive lab or pathology results from your procedure in your [facility]    account before your physician is able to contact you. Your physician or   their representative will relay the results to you with their   recommendations at their soonest availability.  Thank you,  [employer]

## 2024-03-06 NOTE — ANESTHESIA POSTPROCEDURE EVALUATION
Anesthesia Post Evaluation    Patient: Musa Jangchester    Procedure(s) Performed: Procedure(s) (LRB):  EGD (ESOPHAGOGASTRODUODENOSCOPY) (N/A)    Final Anesthesia Type: general      Patient location during evaluation: PACU  Patient participation: Yes- Able to Participate  Level of consciousness: awake  Post-procedure vital signs: reviewed and stable  Pain management: adequate  Airway patency: patent    PONV status at discharge: No PONV  Anesthetic complications: no      Cardiovascular status: stable  Respiratory status: unassisted  Hydration status: euvolemic  Follow-up not needed.              Vitals Value Taken Time   /78 03/06/24 0959   Temp 36.4 °C (97.5 °F) 03/06/24 0949   Pulse 60 03/06/24 0959   Resp 15 03/06/24 0959   SpO2 95 % 03/06/24 0959         No case tracking events are documented in the log.      Pain/Misa Score: Misa Score: 10 (3/6/2024  9:59 AM)

## 2024-03-07 VITALS
BODY MASS INDEX: 24.65 KG/M2 | RESPIRATION RATE: 15 BRPM | TEMPERATURE: 98 F | HEIGHT: 65 IN | OXYGEN SATURATION: 97 % | HEART RATE: 60 BPM | WEIGHT: 147.94 LBS | DIASTOLIC BLOOD PRESSURE: 74 MMHG | SYSTOLIC BLOOD PRESSURE: 144 MMHG

## 2024-03-11 LAB
FINAL PATHOLOGIC DIAGNOSIS: NORMAL
GROSS: NORMAL
Lab: NORMAL

## 2024-03-13 ENCOUNTER — HOSPITAL ENCOUNTER (OUTPATIENT)
Dept: CARDIOLOGY | Facility: HOSPITAL | Age: 69
Discharge: HOME | End: 2024-03-13
Attending: INTERNAL MEDICINE
Payer: MEDICARE

## 2024-03-13 ENCOUNTER — OFFICE VISIT (OUTPATIENT)
Dept: INTERNAL MEDICINE | Facility: CLINIC | Age: 69
End: 2024-03-13
Payer: MEDICARE

## 2024-03-13 VITALS
OXYGEN SATURATION: 97 % | TEMPERATURE: 97 F | DIASTOLIC BLOOD PRESSURE: 78 MMHG | HEART RATE: 66 BPM | WEIGHT: 154.13 LBS | RESPIRATION RATE: 16 BRPM | SYSTOLIC BLOOD PRESSURE: 152 MMHG | BODY MASS INDEX: 25.68 KG/M2 | HEIGHT: 65 IN

## 2024-03-13 DIAGNOSIS — Z87.891 FORMER SMOKER: ICD-10-CM

## 2024-03-13 DIAGNOSIS — I10 PRIMARY HYPERTENSION: ICD-10-CM

## 2024-03-13 DIAGNOSIS — I85.00 ESOPHAGEAL VARICES WITHOUT BLEEDING, UNSPECIFIED ESOPHAGEAL VARICES TYPE: Primary | ICD-10-CM

## 2024-03-13 DIAGNOSIS — I49.9 IRREGULAR HEART BEATS: ICD-10-CM

## 2024-03-13 DIAGNOSIS — I50.32 CHRONIC HEART FAILURE WITH PRESERVED EJECTION FRACTION: Chronic | ICD-10-CM

## 2024-03-13 DIAGNOSIS — I27.20 PULMONARY HYPERTENSION: Chronic | ICD-10-CM

## 2024-03-13 DIAGNOSIS — I50.32 CHRONIC HEART FAILURE WITH PRESERVED EJECTION FRACTION: ICD-10-CM

## 2024-03-13 DIAGNOSIS — D64.9 MILD ANEMIA: ICD-10-CM

## 2024-03-13 DIAGNOSIS — R77.9 ABNORMALITY OF PLASMA PROTEIN: Chronic | ICD-10-CM

## 2024-03-13 DIAGNOSIS — R01.1 HEART MURMUR: ICD-10-CM

## 2024-03-13 DIAGNOSIS — R00.1 BRADYCARDIA: ICD-10-CM

## 2024-03-13 DIAGNOSIS — J41.0 SIMPLE CHRONIC BRONCHITIS: Chronic | ICD-10-CM

## 2024-03-13 DIAGNOSIS — D64.9 MILD ANEMIA: Chronic | ICD-10-CM

## 2024-03-13 DIAGNOSIS — R00.2 PALPITATIONS: ICD-10-CM

## 2024-03-13 DIAGNOSIS — J45.909 ASTHMA, UNSPECIFIED ASTHMA SEVERITY, UNSPECIFIED WHETHER COMPLICATED, UNSPECIFIED WHETHER PERSISTENT: ICD-10-CM

## 2024-03-13 DIAGNOSIS — E78.2 MIXED HYPERLIPIDEMIA: ICD-10-CM

## 2024-03-13 PROCEDURE — 99999 PR PBB SHADOW E&M-EST. PATIENT-LVL V: CPT | Mod: PBBFAC,,, | Performed by: PHYSICIAN ASSISTANT

## 2024-03-13 PROCEDURE — 93017 CV STRESS TEST TRACING ONLY: CPT

## 2024-03-13 PROCEDURE — 93018 CV STRESS TEST I&R ONLY: CPT | Mod: ,,, | Performed by: INTERNAL MEDICINE

## 2024-03-13 PROCEDURE — 93016 CV STRESS TEST SUPVJ ONLY: CPT | Mod: ,,, | Performed by: INTERNAL MEDICINE

## 2024-03-13 PROCEDURE — 99214 OFFICE O/P EST MOD 30 MIN: CPT | Mod: S$GLB,,, | Performed by: PHYSICIAN ASSISTANT

## 2024-03-13 RX ORDER — CARVEDILOL 12.5 MG/1
12.5 TABLET ORAL 2 TIMES DAILY WITH MEALS
Qty: 60 TABLET | Refills: 11 | Status: SHIPPED | OUTPATIENT
Start: 2024-03-13 | End: 2025-03-13

## 2024-03-13 NOTE — PROGRESS NOTES
Subjective:      Patient ID: Musa Campos is a 68 y.o. male.    Chief Complaint: Follow-up    HPI  Here today for a follow up to review his recent test results.     He lost 13 lbs in the last 1-2 months without change in appetite and has had some fatigue.  He has no acute complaints.  We reviewed his current imaging and lab results and my suspicions regarding both.  His blood work is most notably suspicious for a paraproteinemia and his CT chest showed a questionable esophageal mass. Recent EGD showed Grade I esophageal varices and bleb in esophagus. There was somee erythematous mucosa in the antrum came back with chronic inflammation on path.    Denies any hemoptysis, dysphagia, abdominal pain, or nausea/vomiting.    NM PET CT whole body did not show any abnormal FDG avidity in the skeleton.  No focal suspicious osseous lesion. The polypoid intraluminal esophageal mass at the level of the roslyn shows no FDG avidity above background.  Scheduled for follow up with hem/onc tomorrow.     His social history is notable for that he smokes marijuana daily; former smoker 55 pack years (quit 3-4 years ago); former heavy alcohol use (quit 02/14/1998).   Weight up 7lbs today from previous week.    BP elevated this morning. PT states that he did not take his bp meds this morning.   Wt Readings from Last 3 Encounters:   03/13/24 0851 69.9 kg (154 lb 1.6 oz)   03/06/24 0859 67.1 kg (147 lb 14.9 oz)   03/04/24 1138 67.4 kg (148 lb 9.4 oz)      BP Readings from Last 3 Encounters:   03/13/24 (!) 152/78   03/06/24 (!) 144/74   02/28/24 (!) 145/75      Patient Active Problem List   Diagnosis    Primary hypertension    Former smoker    Excessive cerumen in ear canal, bilateral    Presbycusis of both ears    Abnormality of plasma protein    Mixed hyperlipidemia    Mild anemia    History of prostate cancer    Asthma    Lung nodule, multiple    COPD - chronic bronchitis    Palpitations    Hyperuricemia    Monoclonal gammopathy     Esophageal mass    Pulmonary hypertension    Chronic heart failure with preserved ejection fraction    Plasma cell dyscrasia - Rule out smoldering multiple myeloma    Esophageal varices without bleeding         Current Outpatient Medications:     albuterol (PROVENTIL/VENTOLIN HFA) 90 mcg/actuation inhaler, Inhale 2 puffs into the lungs every 6 (six) hours as needed for Wheezing. Rescue, Disp: 18 g, Rfl: 11    allopurinoL (ZYLOPRIM) 100 MG tablet, Take 1 tablet (100 mg total) by mouth once daily., Disp: 30 tablet, Rfl: 1    atorvastatin (LIPITOR) 80 MG tablet, Take 1 tablet (80 mg total) by mouth once daily., Disp: 90 tablet, Rfl: 3    fluticasone-salmeterol diskus inhaler 250-50 mcg, Inhale 1 puff into the lungs 2 (two) times daily. Controller, Disp: 180 each, Rfl: 3    gatifloxacin 0.5 % Drop drops, Place 1 drop into the left eye 2 (two) times daily. Eyedrops to start one day before surgery, Disp: 5 mL, Rfl: 2    ketorolac 0.5% (ACULAR) 0.5 % Drop, Place 1 drop into the left eye 4 (four) times daily. Eyedrops to start one day before surgery, Disp: 5 mL, Rfl: 2    naproxen (NAPROSYN) 500 MG tablet, Take 1 tablet (500 mg total) by mouth 2 (two) times daily as needed (for pain in joints)., Disp: 30 tablet, Rfl: 1    omega-3 fatty acids-fish oil 340-1,000 mg Cap, Take 1 capsule by mouth once daily., Disp: 90 capsule, Rfl: 3    prednisoLONE acetate (PRED FORTE) 1 % DrpS, Place 1 drop into the left eye 4 (four) times daily. Start one day before surgery, Disp: 5 mL, Rfl: 2    valsartan-hydrochlorothiazide (DIOVAN-HCT) 320-25 mg per tablet, Take 1 tablet by mouth once daily., Disp: 90 tablet, Rfl: 1    carvediloL (COREG) 12.5 MG tablet, Take 1 tablet (12.5 mg total) by mouth 2 (two) times daily with meals., Disp: 60 tablet, Rfl: 11    colchicine (COLCRYS) 0.6 mg tablet, Take 1 tablet (0.6 mg total) by mouth once daily. Take one tablet daily. for 6 days, Disp: 6 tablet, Rfl: 0    methylPREDNISolone (MEDROL DOSEPACK) 4 mg  "tablet, Take 1 tablet (4 mg total) by mouth once daily. Use as instructed on dose pack (Patient not taking: Reported on 3/13/2024), Disp: 21 tablet, Rfl: 0    Review of Systems   Constitutional:  Positive for unexpected weight change. Negative for activity change, appetite change, chills, diaphoresis, fatigue and fever.   HENT: Negative.  Negative for congestion, hearing loss, postnasal drip, rhinorrhea, sore throat, trouble swallowing and voice change.    Eyes: Negative.  Negative for visual disturbance.   Respiratory: Negative.  Negative for cough, choking, chest tightness and shortness of breath.    Cardiovascular:  Negative for chest pain, palpitations and leg swelling.   Gastrointestinal:  Negative for abdominal distention, abdominal pain, blood in stool, constipation, diarrhea, nausea and vomiting.   Endocrine: Negative for cold intolerance, heat intolerance, polydipsia and polyuria.   Genitourinary: Negative.  Negative for difficulty urinating and frequency.   Musculoskeletal:  Negative for arthralgias, back pain, gait problem, joint swelling and myalgias.   Skin:  Negative for color change, pallor, rash and wound.   Neurological:  Negative for dizziness, tremors, weakness, light-headedness, numbness and headaches.   Hematological:  Negative for adenopathy.   Psychiatric/Behavioral:  Negative for behavioral problems, confusion, self-injury, sleep disturbance and suicidal ideas. The patient is not nervous/anxious.      Objective:   BP (!) 152/78 (BP Location: Left arm, Patient Position: Sitting, BP Method: Medium (Manual))   Pulse 66   Temp 97.1 °F (36.2 °C) (Tympanic)   Resp 16   Ht 5' 5" (1.651 m)   Wt 69.9 kg (154 lb 1.6 oz)   SpO2 97%   BMI 25.64 kg/m²     Physical Exam  Vitals reviewed.   Constitutional:       General: He is not in acute distress.     Appearance: Normal appearance. He is well-developed. He is not ill-appearing, toxic-appearing or diaphoretic.   HENT:      Head: Normocephalic and " atraumatic.      Right Ear: External ear normal.      Left Ear: External ear normal.      Nose: Nose normal.   Eyes:      Conjunctiva/sclera: Conjunctivae normal.      Pupils: Pupils are equal, round, and reactive to light.   Cardiovascular:      Rate and Rhythm: Normal rate and regular rhythm.      Heart sounds: Normal heart sounds. No murmur heard.     No friction rub. No gallop.   Pulmonary:      Effort: Pulmonary effort is normal. No respiratory distress.      Breath sounds: Normal breath sounds. No wheezing or rales.   Chest:      Chest wall: No tenderness.   Abdominal:      General: There is no distension.      Palpations: Abdomen is soft.      Tenderness: There is no abdominal tenderness.   Musculoskeletal:         General: Normal range of motion.      Cervical back: Normal range of motion and neck supple.   Lymphadenopathy:      Cervical: No cervical adenopathy.   Skin:     General: Skin is warm and dry.      Capillary Refill: Capillary refill takes less than 2 seconds.      Findings: No rash.   Neurological:      Mental Status: He is alert and oriented to person, place, and time.      Motor: No weakness.      Coordination: Coordination normal.      Gait: Gait normal.   Psychiatric:         Mood and Affect: Mood normal.         Behavior: Behavior normal.         Thought Content: Thought content normal.         Judgment: Judgment normal.       NM PET CT FDG Whole Body  Narrative: EXAMINATION:  NM PET CT FDG WHOLE BODY    CLINICAL HISTORY:  Hematologic malignancy, assess treatment response; Monoclonal gammopathy.  Esophageal mass seen on CT.    TECHNIQUE:  13.04 mCi of F18-FDG was administered intravenously in the right antecubital fossa.  After an approximately 60 min distribution time, PET/CT images were acquired from the skull vertex through the feet. Transmission images were acquired to correct for attenuation using a whole body low-dose CT scan without contrast with the arms positioned along the side of  the torso. Glycemia at the time of injection was 87 mg/dL.    COMPARISON:  Chest CT, 02/08/2024    FINDINGS:  Quality of the study: Adequate.    SUV max of the liver parenchyma is 2.4    Head/neck: No abnormal FDG avidity.  No lymphadenopathy or mass.    Chest: The intraluminal polypoid esophageal mass seen in the midesophagus at the level of the roslyn is re-identified on this exam in shows no FDG avidity above background with SUV max 1.6.  No FDG avid mediastinal or hilar or axillary lymphadenopathy.  Centrilobular emphysema.  No FDG avid pulmonary nodules or infiltrates.  The 8 mm noncalcified pulmonary nodule in the right lower lobe shows no FDG avidity.  No pleural effusion.    Abdomen/pelvis: No lymphadenopathy or ascites.  No mass.  No abnormal focus of FDG avidity.    Skeletal structures including upper and lower extremities: No abnormal FDG avidity in the skeleton.  No focal lytic or sclerotic lesion in the osseous structures.    Physiologic uptake of the tracer is present within the brain, salivary glands, myocardium, GI and  tracts.    Incidental CT findings: Calcified gallstone in the gallbladder lumen.  Impression: 1. No abnormal FDG avidity in the skeleton.  No focal suspicious osseous lesion.  2. The polypoid intraluminal esophageal mass at the level of the roslyn shows no FDG avidity above background.  3. No abnormal FDG avidity on this exam.  All CT scans at this facility are performed  using dose modulation techniques as appropriate to performed exam including the following:  automated exposure control; adjustment of mA and/or kV according to the patients size (this includes techniques or standardized protocols for targeted exams where dose is matched to indication/reason for exam: i.e. extremities or head);  iterative reconstruction technique.    Electronically signed by: Yogi Walker MD  Date:    03/05/2024  Time:    13:55     Hospital Outpatient Visit on 03/13/2024   Component Date Value Ref  Range Status    85% Max Predicted HR 03/13/2024 128   In process    Max Predicted HR 03/13/2024 150   In process    [facility] CV CPX PATIENT IS MALE 03/13/2024 1.0   In process    [facility] CV CPX PATIENT IS FEMALE 03/13/2024 0.0   In process    HR at rest 03/13/2024 67  bpm In process    Systolic blood pressure 03/13/2024 175  mmHg In process    Diastolic blood pressure 03/13/2024 91  mmHg In process    RPP 03/13/2024 11,725   In process    Exercise duration (min) 03/13/2024 3  minutes In process    Exercise duration (sec) 03/13/2024 41  seconds In process    Peak HR 03/13/2024 120  bpm In process    Peak Systolic BP 03/13/2024 226  mmHg In process    Peak Diatolic BP 03/13/2024 87  mmHg In process    Peak RPP 03/13/2024 27,120   In process    % Max HR Achieved 03/13/2024 80   In process   Admission on 03/06/2024, Discharged on 03/06/2024   Component Date Value Ref Range Status    Final Pathologic Diagnosis 03/06/2024    Final                    Value:GASTRIC BIOPSIES:  MODERATELY INTENSE NONSPECIFIC CHRONIC INFLAMMATION  NO HELICOBACTER HAVE BEEN IDENTIFIED WITH AN IMMUNOHISTOCHEMICAL STAIN FOR HELICOBACTER.  THE POSITIVE AND NEGATIVE CONTROLS STAINED APPROPRIATELY      Interp By Hardeep Kent M.D., Signed on 03/11/2024 at 12:39    Gross 03/06/2024    Final                    Value:Patient ID/MRN:  [id number]  Pathology ID/MRN:  [id number]  In formalin, labeled &quot;random gastric biopsy, rule out H pylori&quot;, are 2 soft pink tissue fragments that measure in aggregate 0.5 x 0.3 cm.  The specimen is entirely submitted in cassette [id number]    Irineo CHENG ([facility]) cm      Disclaimer 03/06/2024 Unless the case is a 'gross only' or additional testing only, the final diagnosis for each specimen is based on a microscopic examination of appropriate tissue sections.   Final   Lab Visit on 02/20/2024   Component Date Value Ref Range Status    Sed Rate 02/20/2024 96 (H)  0 - 23 mm/Hr Final    CRP 02/20/2024 4.3  0.0 - 8.2 mg/L  Final    Uric Acid 02/20/2024 8.1 (H)  3.4 - 7.0 mg/dL Final       Assessment:     1. Esophageal varices without bleeding, unspecified esophageal varices type    2. Former smoker    3. Primary hypertension    4. Pulmonary hypertension    5. Chronic heart failure with preserved ejection fraction    6. COPD - chronic bronchitis    7. Mild anemia    8. Abnormality of plasma protein      Plan:   Esophageal varices without bleeding, unspecified esophageal varices type  -     Ambulatory referral/consult to Hepatology; Future; Expected date: 03/20/2024  -     carvediloL (COREG) 12.5 MG tablet; Take 1 tablet (12.5 mg total) by mouth 2 (two) times daily with meals.  Dispense: 60 tablet; Refill: 11    Former smoker    Primary hypertension  -     carvediloL (COREG) 12.5 MG tablet; Take 1 tablet (12.5 mg total) by mouth 2 (two) times daily with meals.  Dispense: 60 tablet; Refill: 11    Pulmonary hypertension    Chronic heart failure with preserved ejection fraction    COPD - chronic bronchitis    Mild anemia    Abnormality of plasma protein    -monitor asthma symptoms on nonselective beta blocker. Discussed with patient.   -recheck BP in 2 weeks.   -follow up with hem/onc tomorrow as scheduled.   -refer to hepatology for further eval of esophageal varices.      Follow up in about 2 weeks (around 3/27/2024), or if symptoms worsen or fail to improve.

## 2024-03-13 NOTE — H&P (VIEW-ONLY)
Subjective:      Patient ID: Musa Campos is a 68 y.o. male.    Chief Complaint: Follow-up    HPI  Here today for a follow up to review his recent test results.     He lost 13 lbs in the last 1-2 months without change in appetite and has had some fatigue.  He has no acute complaints.  We reviewed his current imaging and lab results and my suspicions regarding both.  His blood work is most notably suspicious for a paraproteinemia and his CT chest showed a questionable esophageal mass. Recent EGD showed Grade I esophageal varices and bleb in esophagus. There was somee erythematous mucosa in the antrum came back with chronic inflammation on path.    Denies any hemoptysis, dysphagia, abdominal pain, or nausea/vomiting.    NM PET CT whole body did not show any abnormal FDG avidity in the skeleton.  No focal suspicious osseous lesion. The polypoid intraluminal esophageal mass at the level of the roslyn shows no FDG avidity above background.  Scheduled for follow up with hem/onc tomorrow.     His social history is notable for that he smokes marijuana daily; former smoker 55 pack years (quit 3-4 years ago); former heavy alcohol use (quit 02/14/1998).   Weight up 7lbs today from previous week.    BP elevated this morning. PT states that he did not take his bp meds this morning.   Wt Readings from Last 3 Encounters:   03/13/24 0851 69.9 kg (154 lb 1.6 oz)   03/06/24 0859 67.1 kg (147 lb 14.9 oz)   03/04/24 1138 67.4 kg (148 lb 9.4 oz)      BP Readings from Last 3 Encounters:   03/13/24 (!) 152/78   03/06/24 (!) 144/74   02/28/24 (!) 145/75      Patient Active Problem List   Diagnosis    Primary hypertension    Former smoker    Excessive cerumen in ear canal, bilateral    Presbycusis of both ears    Abnormality of plasma protein    Mixed hyperlipidemia    Mild anemia    History of prostate cancer    Asthma    Lung nodule, multiple    COPD - chronic bronchitis    Palpitations    Hyperuricemia    Monoclonal gammopathy     Esophageal mass    Pulmonary hypertension    Chronic heart failure with preserved ejection fraction    Plasma cell dyscrasia - Rule out smoldering multiple myeloma    Esophageal varices without bleeding         Current Outpatient Medications:     albuterol (PROVENTIL/VENTOLIN HFA) 90 mcg/actuation inhaler, Inhale 2 puffs into the lungs every 6 (six) hours as needed for Wheezing. Rescue, Disp: 18 g, Rfl: 11    allopurinoL (ZYLOPRIM) 100 MG tablet, Take 1 tablet (100 mg total) by mouth once daily., Disp: 30 tablet, Rfl: 1    atorvastatin (LIPITOR) 80 MG tablet, Take 1 tablet (80 mg total) by mouth once daily., Disp: 90 tablet, Rfl: 3    fluticasone-salmeterol diskus inhaler 250-50 mcg, Inhale 1 puff into the lungs 2 (two) times daily. Controller, Disp: 180 each, Rfl: 3    gatifloxacin 0.5 % Drop drops, Place 1 drop into the left eye 2 (two) times daily. Eyedrops to start one day before surgery, Disp: 5 mL, Rfl: 2    ketorolac 0.5% (ACULAR) 0.5 % Drop, Place 1 drop into the left eye 4 (four) times daily. Eyedrops to start one day before surgery, Disp: 5 mL, Rfl: 2    naproxen (NAPROSYN) 500 MG tablet, Take 1 tablet (500 mg total) by mouth 2 (two) times daily as needed (for pain in joints)., Disp: 30 tablet, Rfl: 1    omega-3 fatty acids-fish oil 340-1,000 mg Cap, Take 1 capsule by mouth once daily., Disp: 90 capsule, Rfl: 3    prednisoLONE acetate (PRED FORTE) 1 % DrpS, Place 1 drop into the left eye 4 (four) times daily. Start one day before surgery, Disp: 5 mL, Rfl: 2    valsartan-hydrochlorothiazide (DIOVAN-HCT) 320-25 mg per tablet, Take 1 tablet by mouth once daily., Disp: 90 tablet, Rfl: 1    carvediloL (COREG) 12.5 MG tablet, Take 1 tablet (12.5 mg total) by mouth 2 (two) times daily with meals., Disp: 60 tablet, Rfl: 11    colchicine (COLCRYS) 0.6 mg tablet, Take 1 tablet (0.6 mg total) by mouth once daily. Take one tablet daily. for 6 days, Disp: 6 tablet, Rfl: 0    methylPREDNISolone (MEDROL DOSEPACK) 4 mg  "tablet, Take 1 tablet (4 mg total) by mouth once daily. Use as instructed on dose pack (Patient not taking: Reported on 3/13/2024), Disp: 21 tablet, Rfl: 0    Review of Systems   Constitutional:  Positive for unexpected weight change. Negative for activity change, appetite change, chills, diaphoresis, fatigue and fever.   HENT: Negative.  Negative for congestion, hearing loss, postnasal drip, rhinorrhea, sore throat, trouble swallowing and voice change.    Eyes: Negative.  Negative for visual disturbance.   Respiratory: Negative.  Negative for cough, choking, chest tightness and shortness of breath.    Cardiovascular:  Negative for chest pain, palpitations and leg swelling.   Gastrointestinal:  Negative for abdominal distention, abdominal pain, blood in stool, constipation, diarrhea, nausea and vomiting.   Endocrine: Negative for cold intolerance, heat intolerance, polydipsia and polyuria.   Genitourinary: Negative.  Negative for difficulty urinating and frequency.   Musculoskeletal:  Negative for arthralgias, back pain, gait problem, joint swelling and myalgias.   Skin:  Negative for color change, pallor, rash and wound.   Neurological:  Negative for dizziness, tremors, weakness, light-headedness, numbness and headaches.   Hematological:  Negative for adenopathy.   Psychiatric/Behavioral:  Negative for behavioral problems, confusion, self-injury, sleep disturbance and suicidal ideas. The patient is not nervous/anxious.      Objective:   BP (!) 152/78 (BP Location: Left arm, Patient Position: Sitting, BP Method: Medium (Manual))   Pulse 66   Temp 97.1 °F (36.2 °C) (Tympanic)   Resp 16   Ht 5' 5" (1.651 m)   Wt 69.9 kg (154 lb 1.6 oz)   SpO2 97%   BMI 25.64 kg/m²     Physical Exam  Vitals reviewed.   Constitutional:       General: He is not in acute distress.     Appearance: Normal appearance. He is well-developed. He is not ill-appearing, toxic-appearing or diaphoretic.   HENT:      Head: Normocephalic and " atraumatic.      Right Ear: External ear normal.      Left Ear: External ear normal.      Nose: Nose normal.   Eyes:      Conjunctiva/sclera: Conjunctivae normal.      Pupils: Pupils are equal, round, and reactive to light.   Cardiovascular:      Rate and Rhythm: Normal rate and regular rhythm.      Heart sounds: Normal heart sounds. No murmur heard.     No friction rub. No gallop.   Pulmonary:      Effort: Pulmonary effort is normal. No respiratory distress.      Breath sounds: Normal breath sounds. No wheezing or rales.   Chest:      Chest wall: No tenderness.   Abdominal:      General: There is no distension.      Palpations: Abdomen is soft.      Tenderness: There is no abdominal tenderness.   Musculoskeletal:         General: Normal range of motion.      Cervical back: Normal range of motion and neck supple.   Lymphadenopathy:      Cervical: No cervical adenopathy.   Skin:     General: Skin is warm and dry.      Capillary Refill: Capillary refill takes less than 2 seconds.      Findings: No rash.   Neurological:      Mental Status: He is alert and oriented to person, place, and time.      Motor: No weakness.      Coordination: Coordination normal.      Gait: Gait normal.   Psychiatric:         Mood and Affect: Mood normal.         Behavior: Behavior normal.         Thought Content: Thought content normal.         Judgment: Judgment normal.       NM PET CT FDG Whole Body  Narrative: EXAMINATION:  NM PET CT FDG WHOLE BODY    CLINICAL HISTORY:  Hematologic malignancy, assess treatment response; Monoclonal gammopathy.  Esophageal mass seen on CT.    TECHNIQUE:  13.04 mCi of F18-FDG was administered intravenously in the right antecubital fossa.  After an approximately 60 min distribution time, PET/CT images were acquired from the skull vertex through the feet. Transmission images were acquired to correct for attenuation using a whole body low-dose CT scan without contrast with the arms positioned along the side of  the torso. Glycemia at the time of injection was 87 mg/dL.    COMPARISON:  Chest CT, 02/08/2024    FINDINGS:  Quality of the study: Adequate.    SUV max of the liver parenchyma is 2.4    Head/neck: No abnormal FDG avidity.  No lymphadenopathy or mass.    Chest: The intraluminal polypoid esophageal mass seen in the midesophagus at the level of the roslyn is re-identified on this exam in shows no FDG avidity above background with SUV max 1.6.  No FDG avid mediastinal or hilar or axillary lymphadenopathy.  Centrilobular emphysema.  No FDG avid pulmonary nodules or infiltrates.  The 8 mm noncalcified pulmonary nodule in the right lower lobe shows no FDG avidity.  No pleural effusion.    Abdomen/pelvis: No lymphadenopathy or ascites.  No mass.  No abnormal focus of FDG avidity.    Skeletal structures including upper and lower extremities: No abnormal FDG avidity in the skeleton.  No focal lytic or sclerotic lesion in the osseous structures.    Physiologic uptake of the tracer is present within the brain, salivary glands, myocardium, GI and  tracts.    Incidental CT findings: Calcified gallstone in the gallbladder lumen.  Impression: 1. No abnormal FDG avidity in the skeleton.  No focal suspicious osseous lesion.  2. The polypoid intraluminal esophageal mass at the level of the roslyn shows no FDG avidity above background.  3. No abnormal FDG avidity on this exam.  All CT scans at this facility are performed  using dose modulation techniques as appropriate to performed exam including the following:  automated exposure control; adjustment of mA and/or kV according to the patients size (this includes techniques or standardized protocols for targeted exams where dose is matched to indication/reason for exam: i.e. extremities or head);  iterative reconstruction technique.    Electronically signed by: Yogi Walker MD  Date:    03/05/2024  Time:    13:55     Hospital Outpatient Visit on 03/13/2024   Component Date Value Ref  Range Status    85% Max Predicted HR 03/13/2024 128   In process    Max Predicted HR 03/13/2024 150   In process    [facility] CV CPX PATIENT IS MALE 03/13/2024 1.0   In process    [facility] CV CPX PATIENT IS FEMALE 03/13/2024 0.0   In process    HR at rest 03/13/2024 67  bpm In process    Systolic blood pressure 03/13/2024 175  mmHg In process    Diastolic blood pressure 03/13/2024 91  mmHg In process    RPP 03/13/2024 11,725   In process    Exercise duration (min) 03/13/2024 3  minutes In process    Exercise duration (sec) 03/13/2024 41  seconds In process    Peak HR 03/13/2024 120  bpm In process    Peak Systolic BP 03/13/2024 226  mmHg In process    Peak Diatolic BP 03/13/2024 87  mmHg In process    Peak RPP 03/13/2024 27,120   In process    % Max HR Achieved 03/13/2024 80   In process   Admission on 03/06/2024, Discharged on 03/06/2024   Component Date Value Ref Range Status    Final Pathologic Diagnosis 03/06/2024    Final                    Value:GASTRIC BIOPSIES:  MODERATELY INTENSE NONSPECIFIC CHRONIC INFLAMMATION  NO HELICOBACTER HAVE BEEN IDENTIFIED WITH AN IMMUNOHISTOCHEMICAL STAIN FOR HELICOBACTER.  THE POSITIVE AND NEGATIVE CONTROLS STAINED APPROPRIATELY      Interp By Hardeep Kent M.D., Signed on 03/11/2024 at 12:39    Gross 03/06/2024    Final                    Value:Patient ID/MRN:  [id number]  Pathology ID/MRN:  [id number]  In formalin, labeled &quot;random gastric biopsy, rule out H pylori&quot;, are 2 soft pink tissue fragments that measure in aggregate 0.5 x 0.3 cm.  The specimen is entirely submitted in cassette KOS--1-A    Irineo CHENG ([facility]) cm      Disclaimer 03/06/2024 Unless the case is a 'gross only' or additional testing only, the final diagnosis for each specimen is based on a microscopic examination of appropriate tissue sections.   Final   Lab Visit on 02/20/2024   Component Date Value Ref Range Status    Sed Rate 02/20/2024 96 (H)  0 - 23 mm/Hr Final    CRP 02/20/2024 4.3  0.0 - 8.2 mg/L  Final    Uric Acid 02/20/2024 8.1 (H)  3.4 - 7.0 mg/dL Final       Assessment:     1. Esophageal varices without bleeding, unspecified esophageal varices type    2. Former smoker    3. Primary hypertension    4. Pulmonary hypertension    5. Chronic heart failure with preserved ejection fraction    6. COPD - chronic bronchitis    7. Mild anemia    8. Abnormality of plasma protein      Plan:   Esophageal varices without bleeding, unspecified esophageal varices type  -     Ambulatory referral/consult to Hepatology; Future; Expected date: 03/20/2024  -     carvediloL (COREG) 12.5 MG tablet; Take 1 tablet (12.5 mg total) by mouth 2 (two) times daily with meals.  Dispense: 60 tablet; Refill: 11    Former smoker    Primary hypertension  -     carvediloL (COREG) 12.5 MG tablet; Take 1 tablet (12.5 mg total) by mouth 2 (two) times daily with meals.  Dispense: 60 tablet; Refill: 11    Pulmonary hypertension    Chronic heart failure with preserved ejection fraction    COPD - chronic bronchitis    Mild anemia    Abnormality of plasma protein    -monitor asthma symptoms on nonselective beta blocker. Discussed with patient.   -recheck BP in 2 weeks.   -follow up with hem/onc tomorrow as scheduled.   -refer to hepatology for further eval of esophageal varices.      Follow up in about 2 weeks (around 3/27/2024), or if symptoms worsen or fail to improve.

## 2024-03-14 LAB
CV STRESS BASE HR: 67 BPM
DIASTOLIC BLOOD PRESSURE: 91 MMHG
OHS CV CPX 85 PERCENT MAX PREDICTED HEART RATE MALE: 128
OHS CV CPX MAX PREDICTED HEART RATE: 150
OHS CV CPX PATIENT IS FEMALE: 0
OHS CV CPX PATIENT IS MALE: 1
OHS CV CPX PEAK DIASTOLIC BLOOD PRESSURE: 87 MMHG
OHS CV CPX PEAK HEAR RATE: 120 BPM
OHS CV CPX PEAK RATE PRESSURE PRODUCT: NORMAL
OHS CV CPX PEAK SYSTOLIC BLOOD PRESSURE: 226 MMHG
OHS CV CPX PERCENT MAX PREDICTED HEART RATE ACHIEVED: 80
OHS CV CPX RATE PRESSURE PRODUCT PRESENTING: NORMAL
STRESS ECHO POST EXERCISE DUR MIN: 3 MINUTES
STRESS ECHO POST EXERCISE DUR SEC: 41 SECONDS
STRESS ST DEPRESSION: 0.5 MM
SYSTOLIC BLOOD PRESSURE: 175 MMHG

## 2024-03-21 ENCOUNTER — TELEPHONE (OUTPATIENT)
Dept: RADIOLOGY | Facility: HOSPITAL | Age: 69
End: 2024-03-21
Payer: MEDICARE

## 2024-03-21 NOTE — TELEPHONE ENCOUNTER
Interventional Radiology  Scheduled 10:30AM bone marrow biopsy for 3/28/24 w/patient's daughter, Marychuy.  Instructed that pt. needs to arrive by 9:00AM at hospital off [address], he must have a ride and NPO after 4:00AM the morning of the procedure.  Pt. will stop Aleve and fish oil on 3/21/24.  He does not take GLP-1/GIP agonists.  I gave patient's daughter our direct callback number [phone] and she verbalized understanding of all instructions.

## 2024-03-27 ENCOUNTER — TELEPHONE (OUTPATIENT)
Dept: RADIOLOGY | Facility: HOSPITAL | Age: 69
End: 2024-03-27
Payer: MEDICARE

## 2024-03-27 NOTE — TELEPHONE ENCOUNTER
Interventional Radiology:    Spoke with pt's daughter and states that he will be there. Informed pt's daughter that NPO after midnight, may take morning meds with a sip of water, arrive to the hospital [facility] [address] for 9:15am, and have someone with him to drive him home. Pt's daughter verified understanding of all discussed.

## 2024-03-28 ENCOUNTER — HOSPITAL ENCOUNTER (OUTPATIENT)
Dept: RADIOLOGY | Facility: HOSPITAL | Age: 69
Discharge: HOME | End: 2024-03-28
Attending: INTERNAL MEDICINE
Payer: MEDICARE

## 2024-03-28 VITALS
BODY MASS INDEX: 24.16 KG/M2 | OXYGEN SATURATION: 94 % | RESPIRATION RATE: 16 BRPM | HEART RATE: 55 BPM | WEIGHT: 145 LBS | DIASTOLIC BLOOD PRESSURE: 66 MMHG | SYSTOLIC BLOOD PRESSURE: 135 MMHG | HEIGHT: 65 IN

## 2024-03-28 DIAGNOSIS — E88.09 PLASMA CELL DYSCRASIA: Chronic | ICD-10-CM

## 2024-03-28 DIAGNOSIS — R77.9 ABNORMALITY OF PLASMA PROTEIN: Chronic | ICD-10-CM

## 2024-03-28 LAB
ALBUMIN SERPL BCP-MCNC: 3.1 G/DL (ref 3.5–5.2)
ALP SERPL-CCNC: 85 U/L (ref 55–135)
ALT SERPL W/O P-5'-P-CCNC: 15 U/L (ref 10–44)
ANION GAP SERPL CALC-SCNC: 10 MMOL/L (ref 8–16)
AST SERPL-CCNC: 16 U/L (ref 10–40)
BASOPHILS # BLD AUTO: 0.03 K/UL (ref 0–0.2)
BASOPHILS NFR BLD: 0.4 % (ref 0–1.9)
BILIRUB SERPL-MCNC: 0.5 MG/DL (ref 0.1–1)
BUN SERPL-MCNC: 18 MG/DL (ref 8–23)
CALCIUM SERPL-MCNC: 9.6 MG/DL (ref 8.7–10.5)
CHLORIDE SERPL-SCNC: 105 MMOL/L (ref 95–110)
CO2 SERPL-SCNC: 25 MMOL/L (ref 23–29)
CREAT SERPL-MCNC: 1 MG/DL (ref 0.5–1.4)
DIFFERENTIAL METHOD BLD: ABNORMAL
EOSINOPHIL # BLD AUTO: 0.4 K/UL (ref 0–0.5)
EOSINOPHIL NFR BLD: 5.6 % (ref 0–8)
ERYTHROCYTE [DISTWIDTH] IN BLOOD BY AUTOMATED COUNT: 15 % (ref 11.5–14.5)
EST. GFR  (NO RACE VARIABLE): >60 ML/MIN/1.73 M^2
GLUCOSE SERPL-MCNC: 102 MG/DL (ref 70–110)
HCT VFR BLD AUTO: 38.6 % (ref 40–54)
HGB BLD-MCNC: 12.2 G/DL (ref 14–18)
IMM GRANULOCYTES # BLD AUTO: 0.02 K/UL (ref 0–0.04)
IMM GRANULOCYTES NFR BLD AUTO: 0.3 % (ref 0–0.5)
INR PPP: 0.9 (ref 0.8–1.2)
LYMPHOCYTES # BLD AUTO: 3 K/UL (ref 1–4.8)
LYMPHOCYTES NFR BLD: 39.1 % (ref 18–48)
MCH RBC QN AUTO: 27.6 PG (ref 27–31)
MCHC RBC AUTO-ENTMCNC: 31.6 G/DL (ref 32–36)
MCV RBC AUTO: 87 FL (ref 82–98)
MONOCYTES # BLD AUTO: 0.4 K/UL (ref 0.3–1)
MONOCYTES NFR BLD: 5.2 % (ref 4–15)
NEUTROPHILS # BLD AUTO: 3.8 K/UL (ref 1.8–7.7)
NEUTROPHILS NFR BLD: 49.4 % (ref 38–73)
NRBC BLD-RTO: 0 /100 WBC
PLATELET # BLD AUTO: 238 K/UL (ref 150–450)
PMV BLD AUTO: 11 FL (ref 9.2–12.9)
POTASSIUM SERPL-SCNC: 4.4 MMOL/L (ref 3.5–5.1)
PROT SERPL-MCNC: 9.4 G/DL (ref 6–8.4)
PROTHROMBIN TIME: 11 SEC (ref 9–12.5)
RBC # BLD AUTO: 4.42 M/UL (ref 4.6–6.2)
SODIUM SERPL-SCNC: 140 MMOL/L (ref 136–145)
WBC # BLD AUTO: 7.67 K/UL (ref 3.9–12.7)

## 2024-03-28 PROCEDURE — 88305 TISSUE EXAM BY PATHOLOGIST: CPT | Mod: 26,,, | Performed by: PATHOLOGY

## 2024-03-28 PROCEDURE — 88305 TISSUE EXAM BY PATHOLOGIST: CPT | Mod: 59 | Performed by: PATHOLOGY

## 2024-03-28 PROCEDURE — 85610 PROTHROMBIN TIME: CPT | Performed by: RADIOLOGY

## 2024-03-28 PROCEDURE — 63600175 PHARM REV CODE 636 W HCPCS: Performed by: RADIOLOGY

## 2024-03-28 PROCEDURE — 77012 CT SCAN FOR NEEDLE BIOPSY: CPT | Mod: 26,,, | Performed by: RADIOLOGY

## 2024-03-28 PROCEDURE — 88185 FLOWCYTOMETRY/TC ADD-ON: CPT | Mod: 59 | Performed by: PATHOLOGY

## 2024-03-28 PROCEDURE — 88189 FLOWCYTOMETRY/READ 16 & >: CPT | Mod: ,,, | Performed by: PATHOLOGY

## 2024-03-28 PROCEDURE — 88271 CYTOGENETICS DNA PROBE: CPT | Performed by: RADIOLOGY

## 2024-03-28 PROCEDURE — 80053 COMPREHEN METABOLIC PANEL: CPT | Performed by: RADIOLOGY

## 2024-03-28 PROCEDURE — 88264 CHROMOSOME ANALYSIS 20-25: CPT | Performed by: RADIOLOGY

## 2024-03-28 PROCEDURE — 85097 BONE MARROW INTERPRETATION: CPT | Mod: ,,, | Performed by: PATHOLOGY

## 2024-03-28 PROCEDURE — 85025 COMPLETE CBC W/AUTO DIFF WBC: CPT | Performed by: RADIOLOGY

## 2024-03-28 PROCEDURE — 25000003 PHARM REV CODE 250: Performed by: RADIOLOGY

## 2024-03-28 PROCEDURE — 38221 DX BONE MARROW BIOPSIES: CPT | Mod: RT,,, | Performed by: RADIOLOGY

## 2024-03-28 PROCEDURE — 88342 IMHCHEM/IMCYTCHM 1ST ANTB: CPT | Mod: 26,59,, | Performed by: PATHOLOGY

## 2024-03-28 PROCEDURE — 88313 SPECIAL STAINS GROUP 2: CPT | Mod: 59 | Performed by: PATHOLOGY

## 2024-03-28 PROCEDURE — 88342 IMHCHEM/IMCYTCHM 1ST ANTB: CPT | Mod: 59 | Performed by: PATHOLOGY

## 2024-03-28 PROCEDURE — C1830 POWER BONE MARROW BX NEEDLE: HCPCS

## 2024-03-28 PROCEDURE — 88184 FLOWCYTOMETRY/ TC 1 MARKER: CPT | Performed by: PATHOLOGY

## 2024-03-28 PROCEDURE — 88237 TISSUE CULTURE BONE MARROW: CPT | Performed by: RADIOLOGY

## 2024-03-28 PROCEDURE — 88313 SPECIAL STAINS GROUP 2: CPT | Mod: 26,,, | Performed by: PATHOLOGY

## 2024-03-28 PROCEDURE — 77012 CT SCAN FOR NEEDLE BIOPSY: CPT | Mod: TC

## 2024-03-28 PROCEDURE — 88311 DECALCIFY TISSUE: CPT | Performed by: PATHOLOGY

## 2024-03-28 RX ORDER — FENTANYL CITRATE 50 UG/ML
INJECTION, SOLUTION INTRAMUSCULAR; INTRAVENOUS CODE/TRAUMA/SEDATION MEDICATION
Status: COMPLETED | OUTPATIENT
Start: 2024-03-28 | End: 2024-03-28

## 2024-03-28 RX ORDER — LIDOCAINE HYDROCHLORIDE 10 MG/ML
INJECTION INFILTRATION; PERINEURAL CODE/TRAUMA/SEDATION MEDICATION
Status: COMPLETED | OUTPATIENT
Start: 2024-03-28 | End: 2024-03-28

## 2024-03-28 RX ORDER — MIDAZOLAM HYDROCHLORIDE 1 MG/ML
INJECTION, SOLUTION INTRAMUSCULAR; INTRAVENOUS CODE/TRAUMA/SEDATION MEDICATION
Status: COMPLETED | OUTPATIENT
Start: 2024-03-28 | End: 2024-03-28

## 2024-03-28 RX ADMIN — MIDAZOLAM HYDROCHLORIDE 1 MG: 1 INJECTION, SOLUTION INTRAMUSCULAR; INTRAVENOUS at 11:03

## 2024-03-28 RX ADMIN — FENTANYL CITRATE 50 MCG: 50 INJECTION, SOLUTION INTRAMUSCULAR; INTRAVENOUS at 11:03

## 2024-03-28 RX ADMIN — LIDOCAINE HYDROCHLORIDE 5 ML: 10 INJECTION, SOLUTION INFILTRATION; PERINEURAL at 11:03

## 2024-03-28 NOTE — DISCHARGE INSTRUCTIONS
Please return to ER if any of these symptoms occur:  Fever over 101 degrees,  Any purulent drainage from site (pus, yellow or has foul odor), or any redness or swelling to site  Bleeding from the puncture site not controlled, If bleeding occurs at site hold pressure for 5 mins.  If bleeding continues go to ER  Pain not controlled with Aleve or Tylenol,     No driving for 24 hours after procedure due to sedation given during procedure.      Do not submerge in standing water for 2 days after biopsy but you may shower.     May change bandage if it becomes soiled and bandage may be removed in 2 days.     Rest for the next couple of days. Increase activity as tolerated.     Resume home medications and diet     Biopsy results will be with Dr. Brooke in 5-7 days, please follow up with her for results and any other questions or concerns that you may have.

## 2024-03-28 NOTE — PLAN OF CARE
Received patient from procedure via stretcher. VSS. Breathing even and unlabored. No complaints of pain, nausea, or headache. Procedure to bone marrow, bandage CDI. Family updated with patients status. Patient resting on stretcher

## 2024-03-28 NOTE — DISCHARGE SUMMARY
[facility] (Hospital)  Discharge Note  Short Stay    CT Biopsy Bone Marrow (xpd)      OUTCOME: Patient tolerated treatment/procedure well without complication and is now ready for discharge.    DISPOSITION: Home or Self Care    FINAL DIAGNOSIS:  <principal problem not specified>    FOLLOWUP: In clinic    DISCHARGE INSTRUCTIONS:  No discharge procedures on file.      Clinical Reference Documents Added to Patient Instructions         Document    BONE MARROW ASPIRATION OR BIOPSY (ENGLISH)    PROCEDURAL SEDATION, ADULT ED (ENGLISH)            TIME SPENT ON DISCHARGE: 15 minutes    Pre Op Diagnosis: cell dyscrasia     Post Op Diagnosis: same     Procedure:  Bone marrow biopsy     Procedure performed by: Martha SALMON, Sylvain MAJANO     Written Informed Consent Obtained: Yes     Specimen Removed:  yes     Estimated Blood Loss:  minimal     Findings: Local anesthesia and moderate sedation were used.     The patient tolerated the procedure well and there were no complications.      Disposition:  F/U in clinic    Discharge instructions:  Light activity for 24 hours.  Remove band aid in 24 hours.  No baths (showers are appropriate).    F/U with ordering physician    Sterile technique was performed in the right iliac, lidocaine was used as a local anesthetic.  Multiple samples taken percutaneously from the right iliac bone.  Pt tolerated the procedure well without immediate complications.  Please see radiologist report for details. F/u with PCP and/or ordering physician.

## 2024-04-02 LAB
BODY SITE - BONE MARROW: NORMAL
CLINICAL DIAGNOSIS - BONE MARROW: NORMAL
FLOW CYTOMETRY ANTIBODIES ANALYZED - BONE MARROW: NORMAL
FLOW CYTOMETRY COMMENT - BONE MARROW: NORMAL
FLOW CYTOMETRY INTERPRETATION - BONE MARROW: NORMAL

## 2024-04-03 LAB
ANNOTATION COMMENT IMP: NORMAL
HOLDF INTERPRETATION: NORMAL
HOLDF REASON FOR REFERRAL: NORMAL
HOLDF RELEASED BY: NORMAL
HOLDF REQUESTED FISH TEST: NORMAL
HOLDF SOURCE: NORMAL
MOL DX INTERP BLD/T QL: NORMAL
REF LAB TEST METHOD: NORMAL
SPECIMEN TYPE: NORMAL

## 2024-04-08 ENCOUNTER — OFFICE VISIT (OUTPATIENT)
Dept: HEMATOLOGY/ONCOLOGY | Facility: CLINIC | Age: 69
End: 2024-04-08
Payer: MEDICARE

## 2024-04-08 DIAGNOSIS — I85.00 ESOPHAGEAL VARICES WITHOUT BLEEDING, UNSPECIFIED ESOPHAGEAL VARICES TYPE: ICD-10-CM

## 2024-04-08 DIAGNOSIS — D47.2 SMOLDERING MYELOMA: Primary | ICD-10-CM

## 2024-04-08 DIAGNOSIS — R77.9 ABNORMALITY OF PLASMA PROTEIN: Chronic | ICD-10-CM

## 2024-04-08 DIAGNOSIS — K22.89 ESOPHAGEAL MASS: Chronic | ICD-10-CM

## 2024-04-08 LAB
COMMENT: NORMAL
FINAL PATHOLOGIC DIAGNOSIS: NORMAL
GROSS: NORMAL
Lab: NORMAL
MICROSCOPIC EXAM: NORMAL
SUPPLEMENTAL DIAGNOSIS: NORMAL

## 2024-04-08 PROCEDURE — 99213 OFFICE O/P EST LOW 20 MIN: CPT | Mod: 95,,, | Performed by: INTERNAL MEDICINE

## 2024-04-08 NOTE — PROGRESS NOTES
[facility]  [address]  [place] [address]  Phone: [phone];  Fax: [phone]    Patient ID: Musa Campos   Chief Complaint: Follow-up  MRN:  [id number]     Oncologic Diagnosis:  Esophageal Mass  Hematologic Diagnosis:  Paraproteinemia  Current Treatment:  N/A    The patient location is: home  The chief complaint leading to consultation is: follow up    Visit type: audiovisual    Face to Face time with patient: 15  45 minutes of total time spent on the encounter, which includes face to face time and non-face to face time preparing to see the patient (eg, review of tests), Obtaining and/or reviewing separately obtained history, Documenting clinical information in the electronic or other health record, Independently interpreting results (not separately reported) and communicating results to the patient/family/caregiver, or Care coordination (not separately reported).     Each patient to whom he or she provides medical services by telemedicine is:  (1) informed of the relationship between the physician and patient and the respective role of any other health care provider with respect to management of the patient; and (2) notified that he or she may decline to receive medical services by telemedicine and may withdraw from such care at any time.    Notes:   Subjective   The patient presents for follow up.  He has no acute complaints.  I reviewed his gastric biopsy, BM biopsy and PET-CT with him and his daughter in detail.  Thankfully the gastric mass was a conglomerate of esophageal varices.  Although grade 1, I counseled him on bleeding precautions.    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 I will repeat myeloma studies in a few weeks.    Review of Systems   Constitutional:  Negative for activity change, appetite change, chills, diaphoresis, fatigue, fever  and unexpected weight change.   HENT:  Negative for nosebleeds, trouble swallowing and voice change.    Respiratory:  Negative for shortness of breath.    Cardiovascular:  Negative for chest pain.   Gastrointestinal:  Negative for abdominal distention, abdominal pain, anal bleeding, blood in stool, constipation, diarrhea, nausea and vomiting.   Genitourinary:  Negative for difficulty urinating and hematuria.   Musculoskeletal:  Positive for myalgias (only with gout flares). Negative for arthralgias and back pain.   Skin:  Negative for rash.   Neurological:  Negative for dizziness, weakness, light-headedness and headaches.   Hematological:  Does not bruise/bleed easily.   Psychiatric/Behavioral:  The patient is not nervous/anxious.      History   HPI  Musa Campos is a 68 y.o. male with HTN, COPD, Pulmonary Hypertension, HFpEF, History of Prostate Cancer, Lung Nodules and Gout who presents to clinic to establish care and is accompanied by her daughter.    They report that he has had some changes in his health.  He has lost 13 lbs in the last 1-2 months without change in appetite and has had some fatigue.  He has no acute complaints.  We reviewed his current imaging and lab results and my suspicions regarding both.  His blood work is most notably suspicious for a paraproteinemia and his CT chest showed a questionable esophageal mass.  The myeloma spectrum of disorders was explained in detail.    He is not having any difficulty swallowing, sore throat or pain with swallowing.  They expressed understanding of the plan and all questions were answered to their satisfaction.    His social history is notable for that he smokes marijuana daily; former smoker 55 pack years (quit 3-4 years ago); former heavy alcohol use (quit 02/14/1998).      Past Medical History:   Diagnosis Date    Abnormality of plasma protein 02/04/2023    Hyperuricemia 02/03/2024    Monoclonal gammopathy 02/03/2024    Primary hypertension  01/25/2023       Past Surgical History:   Procedure Laterality Date    CATARACT EXTRACTION Left 07/26/2023    ESOPHAGOGASTRODUODENOSCOPY N/A 3/6/2024    Procedure: EGD (ESOPHAGOGASTRODUODENOSCOPY);  Surgeon: Marcie Mcduffie MD;  Location: [facility];  Service: Gastroenterology;  Laterality: N/A;    PROSTATE SURGERY         Family History   Problem Relation Name Age of Onset    No Known Problems Mother      No Known Problems Father         Review of patient's allergies indicates:  No Known Allergies    Social History     Tobacco Use    Smoking status: Former     Types: Cigarettes    Smokeless tobacco: Former    Tobacco comments:     Pt quit smoking about 2 years ago.   Substance Use Topics    Alcohol use: Not Currently    Drug use: Yes     Types: Marijuana        Labs   Labs:  No visits with results within 2 Day(s) from this visit.   Latest known visit with results is:   Hospital Outpatient Visit on 03/28/2024   Component Date Value Ref Range Status    Prothrombin Time 03/28/2024 11.0  9.0 - 12.5 sec Final    INR 03/28/2024 0.9  0.8 - 1.2 Final    Comment: Coumadin Therapy:  2.0 - 3.0 for INR for all indicators except mechanical heart valves  and antiphospholipid syndromes which should use 2.5 - 3.5.      WBC 03/28/2024 7.67  3.90 - 12.70 K/uL Final    RBC 03/28/2024 4.42 (L)  4.60 - 6.20 M/uL Final    Hemoglobin 03/28/2024 12.2 (L)  14.0 - 18.0 g/dL Final    Hematocrit 03/28/2024 38.6 (L)  40.0 - 54.0 % Final    MCV 03/28/2024 87  82 - 98 fL Final    MCH 03/28/2024 27.6  27.0 - 31.0 pg Final    MCHC 03/28/2024 31.6 (L)  32.0 - 36.0 g/dL Final    RDW 03/28/2024 15.0 (H)  11.5 - 14.5 % Final    Platelets 03/28/2024 238  150 - 450 K/uL Final    MPV 03/28/2024 11.0  9.2 - 12.9 fL Final    Immature Granulocytes 03/28/2024 0.3  0.0 - 0.5 % Final    Gran # (ANC) 03/28/2024 3.8  1.8 - 7.7 K/uL Final    Immature Grans (Abs) 03/28/2024 0.02  0.00 - 0.04 K/uL Final    Comment: Mild elevation in immature granulocytes is non  specific and   can be seen in a variety of conditions including stress response,   acute inflammation, trauma and pregnancy. Correlation with other   laboratory and clinical findings is essential.      Lymph # 03/28/2024 3.0  1.0 - 4.8 K/uL Final    Mono # 03/28/2024 0.4  0.3 - 1.0 K/uL Final    Eos # 03/28/2024 0.4  0.0 - 0.5 K/uL Final    Baso # 03/28/2024 0.03  0.00 - 0.20 K/uL Final    nRBC 03/28/2024 0  0 /100 WBC Final    Gran % 03/28/2024 49.4  38.0 - 73.0 % Final    Lymph % 03/28/2024 39.1  18.0 - 48.0 % Final    Mono % 03/28/2024 5.2  4.0 - 15.0 % Final    Eosinophil % 03/28/2024 5.6  0.0 - 8.0 % Final    Basophil % 03/28/2024 0.4  0.0 - 1.9 % Final    Differential Method 03/28/2024 Automated   Final    Sodium 03/28/2024 140  136 - 145 mmol/L Final    Potassium 03/28/2024 4.4  3.5 - 5.1 mmol/L Final    Chloride 03/28/2024 105  95 - 110 mmol/L Final    CO2 03/28/2024 25  23 - 29 mmol/L Final    Glucose 03/28/2024 102  70 - 110 mg/dL Final    BUN 03/28/2024 18  8 - 23 mg/dL Final    Creatinine 03/28/2024 1.0  0.5 - 1.4 mg/dL Final    Calcium 03/28/2024 9.6  8.7 - 10.5 mg/dL Final    Total Protein 03/28/2024 9.4 (H)  6.0 - 8.4 g/dL Final    Albumin 03/28/2024 3.1 (L)  3.5 - 5.2 g/dL Final    Total Bilirubin 03/28/2024 0.5  0.1 - 1.0 mg/dL Final    Comment: For infants and newborns, interpretation of results should be based  on gestational age, weight and in agreement with clinical  observations.    Premature Infant recommended reference ranges:  Up to 24 hours.............<8.0 mg/dL  Up to 48 hours............<12.0 mg/dL  3-5 days..................<15.0 mg/dL  6-29 days.................<15.0 mg/dL      Alkaline Phosphatase 03/28/2024 85  55 - 135 U/L Final    AST 03/28/2024 16  10 - 40 U/L Final    ALT 03/28/2024 15  10 - 44 U/L Final    eGFR 03/28/2024 >60  >60 mL/min/1.73 m^2 Final    Anion Gap 03/28/2024 10  8 - 16 mmol/L Final    HOLDF Result Summary 03/28/2024 Test Not Performed   Final    HOLDF  Interpretation 03/28/2024 Test Not Performed   Final    Comment: Heme FISH Hold, B/BM was cancelled on 04/03/2024 at 15:42;   Miscellaneous test was changed to a more specific test.    This test was held in accordance with the &quot;Hematologic FISH   Hold&quot; process and was reflexed to test MFCDF (Myeloma Fixed   Cell, High Risk, FISH). No sample processing fee was   assessed.        Test Performed by:  [address] [address]  : Umer Eagle M.D. Ph.D.; CLIA# [id number]      HOLDF Reason for Referral 03/28/2024 Test Not Performed   Final    Comment: Abnormality of plasma protein [R77.9 (ICD-10-CM)]; Plasma cell   dyscrasia - Rule out smoldering multiple myeloma [E88.09 (ICD-10-      HOLDF Specimen 03/28/2024 Test Not Performed   Final    HOLDF Source 03/28/2024 Test Not Performed   Final    HOLDF Requested FISH test 03/28/2024 Test Not Performed   Final    HOLDF Method 03/28/2024 Test Not Performed   Final    HOLDF Additional information 03/28/2024 Test Not Performed   Final    HOLDF Released by 03/28/2024 Test Not Performed   Final    Final Pathologic Diagnosis 03/28/2024    Corrected                    Value:BONE MARROW, RIGHT ILIAC CREST (ASPIRATE SMEAR, TOUCH IMPRINT, CLOT SECTION, AND CORE BIOPSY):  -- PLASMA CELL NEOPLASM (50-60 % OF MARROW CELLULARITY).  -- HYPERCELLULAR MARROW (50%) WITH TRILINEAGE HEMATOPOIESIS.  -- NO MORPHOLOGIC EVIDENCE OF AMYLOID DEPOSITS.  -- ADEQUATE STORAGE IRON.  -- SEE COMMENT.      Interp By Darrian Garcia M.D., Signed on 04/08/2024 at 13:58    Supplemental Diagnosis 03/28/2024    Corrected                    Value:Myeloma Fixed Cell, High Risk, FISH ([facility], [address] [place] [id number]):  Results summary: Normal   Interpretation:  The result is within normal limits for 1q duplication, TP53 deletion and IGH rearrangement.      Gross 03/28/2024     Corrected                    Value:Hospital/clinic label MRN:  [id number]  Pathology label MRN:  [id number]    Two specimens are received in a container filled with formalin labeled &quot;clot + core&quot;.     Specimen 1:  The specimen consists of one fragment of red-brown hemorrhagic material measuring 3.8 x 1.5 x 1.3 cm. The specimen is serially sectioned and submitted entirely in cassettes LHP--2-A, AFK--2-B, GUH--2-C, QTL--2-D, and    MGM--2-E.    Specimen 2:   The specimen consists of one tan-yellow core of bone with red-brown hemorrhagic material attached measuring 1.8 x 0.8 x 0.7 cm in aggregate. The specimen is placed in Immunocal and submitted entirely in cassette RBU--3-A.    Martínez Kearney,  Gross Technologist      Microscopic Exam 03/28/2024    Corrected                    Value:CBC (03/28/2024): WBC 7.67 K/uL (granulocyte 49.4 %, lymphocyte 39.1 %, monocyte I 0.2 %, eosinophil 5.6 %, basophil 0.4 %, immature granulocytes 0.3%), RBC 4.42 M/uL, HGB 12.2 g/dL, HCT 38.6 %, MCV 87 fL, MCH 27.6 pg, MCHC 31.6 g/dL, RDW 15.0 %,   Platelet 238 K/uL, MPV 11.0 fL.    BONE MARROW ASPIRATE DIFFERENTIAL:  Blasts-----------------------------------------------0.0 %  Promyelocytes-----------------------------------1.0 %  Myelocytes----------------------------------------3.0 %  Metamyelocytes---------------------------------9.0 %  Bands-----------------------------------------------8.3 %  PMN Neutrophils--------------------------------26.3 %  Eosinophils---------------------------------------3.0 %  Basophils------------------------------------------0.0 %  Monocytes----------------------------------------1.7 %  Lymphocytes-------------------------------------10.0 %  Plasma cells--------------------------------------15.7 %  Erythroid precursors---------------------------22.0 %                              BONE MARROW ASPIRATE SMEAR:  The smears contain cellular spicules.  The stain  quality is suboptimal.  The myeloid to erythroid ratio is approximately 2.4:1.0. Both myeloid and erythroid precursors display orderly maturation. Megakaryocytes are present and morphologically   unremarkable.  Plasma cells comprise approximately 15.7% of the total nucleated cells.  Stainable iron is present. Ring sideroblasts are not identified.    BONE MARROW TOUCH IMPRINT:  The touch imprint displays cellular composition similar to the aspirate smear.    BONE MARROW CLOT SECTION:  The clot sections contain spicules with cellular composition similar to the biopsy core. Stainable iron is present.    BONE MARROW CORE BIOPSY:  Cortical and medullary bones are present. The cellularity is approximately 50%. The myeloid to erythroid ratio is unremarkable. Megakaryocytes are present and morphologically unremarkable.  Plasma cells are increased and form clusters.  Lymphoid   aggregates or granulomas are not ident                          ified.  Stainable iron is not identified.  Congo red stain fails to detect amyloid deposits.  Control is adequate.      Comment 03/28/2024    Corrected                    Value:Flow cytometric analysis of bone marrow detects a lambda restricted plasma cell population that expresses , CD38, and CD45 (very dim to negative).  CD56, CD19 and CD20 are negative.  The sites are polytypic.  T lymphocytes are immunophenotypically   unremarkable.  The blast gate is not increased. Flow differential:  Lymphocytes 35.3%, Monocytes 1.7%, Granulocytes  36.9%, Blast  2.6%, Debris/nRBC 4.0%,  Viability 98.6%.     Immunohistochemical stains are performed on the biopsy core and clot section for greater sensitivity and further architectural assessment with adequate controls.  -positive plasma cells comprise approximately 50-60% of the total cellularity.    Correlation with other lab results is required.      Disclaimer 03/28/2024 Unless the case is a 'gross only' or additional testing only, the  final diagnosis for each specimen is based on a microscopic examination of appropriate tissue sections.   Corrected    Clinical Diagnosis - Bone Marrow 03/28/2024 MM   Final    Body Site - Bone Marrow 03/28/2024 RPI   Final    Bone Marrow Interpretation 03/28/2024    Final                    Value:Findings consistent with plasma cell neoplasm.  Correlation with morphology and any available cytogenetic or molecular studies is recommended.  In addition, correlation with clinical and radiologic data is required for further classification of this   process.       Interp By Darrian Garcia M.D., Signed on 04/02/2024 at 08:56    Bone Marrow Antibodies Analyzed 03/28/2024 All analyzed: CD2, CD3, CD4, CD5, CD7, CD8, CD10, CD13, CD19, CD20, CD34, CD38, CD56, , , KAPPA, Kappa Cytolasmic, LAMBDA, Lambda Cytoplasmic,CD45 and 7AAD.   Final    Bone Marrow Comment 03/28/2024    Final                    Value:Flow cytometric analysis of bone marrow detects a lambda restricted plasma cell population that expresses , CD38, and CD45 (very dim to negative).  CD56, CD19 and CD20 are negative.  The sites are polytypic.  T lymphocytes are immunophenotypically   unremarkable.  The blast gate is not increased.    Flow differential:  Lymphocytes 35.3%, Monocytes 1.7%, Granulocytes  36.9%, Blast  2.6%, Debris/nRBC 4.0%,  Viability 98.6%.      Comment: This test was developed and its performance characteristics   determined by [facility].    It has not been cleared or approved by the U.S. Food and Drug   Administration. The FDA has determined that such clearance or   approval is not necessary.  This test is used for clinical purposes.    It should not be regarded as investigational or for research.  This   laboratory is certified under CLIA-88 as qualified to perform high   complexity clinical laboratory testing.      Chromosome analysis BM Result Summ* 03/28/2024 Normal   Final     Interpretation 03/28/2024 SEE BELOW   Final    Comment: Each of 14 available metaphases appeared normal.    Additional cytogenetic studies are reported separately.      Results 03/28/2024 46,XY[14]   Final    Reason for Referral 03/28/2024 SEE BELOW   Corrected    Comment: Abnormality of plasma protein [R77.9 (ICD-10-CM)]; Plasma  cell dyscrasia - Rule out smoldering multiple myeloma  [E88.09 (ICD-10-      Specimen 03/28/2024 Bone Marrow   Final    Source 03/28/2024 Test Not Performed   Final    Method 03/28/2024 Culture without mitogens   Final    Banding Methods, BM Chromosome 03/28/2024 SEE BELOW   Final    Comment: Band Resolution:  <400  ----------------------------------------------------------   Stain Name Cells         Cells Counted Karyograms Prepared   Analyzed                                          GTL        14            0             2                     Total      14            0             2                     ----------------------------------------------------------     Key to Stain Name: GTL=G-banding; QFQ=Q-banding;  DAPI=DAPI-staining; CBL=C-banding; AGNOR=Silver-staining;  NON=Non-banded    The sum of Cells Analyzed and Cells Counted equals the  total cells examined.      Chromosome analysis BM Additional * 03/28/2024 SEE BELOW   Final    Comment: A portion of the testing process was performed at [facility] [employer] site [id number].      Chromosome analysis BM Released By 03/28/2024 Nia Corbin M.D.   Final    Comment: Test Performed by:  [facility] [place] [address]  : Umer Eagle M.D. Ph.D.; CLIA# [id number]         Pathology   Specimen to Pathology, Surgery Gastrointestinal tract - 03/06/24  Final Pathologic Diagnosis GASTRIC BIOPSIES:  MODERATELY INTENSE NONSPECIFIC CHRONIC INFLAMMATION  NO HELICOBACTER HAVE BEEN IDENTIFIED WITH AN IMMUNOHISTOCHEMICAL STAIN FOR HELICOBACTER.  THE POSITIVE AND  NEGATIVE CONTROLS STAINED APPROPRIATELY   Comment: Interp By Hardeep Kent M.D., Signed on 03/11/2024 at 12:39   Gross Patient ID/MRN:  [id number]  Pathology ID/MRN:  [id number]  In formalin, labeled &quot;random gastric biopsy, rule out H pylori&quot;, are 2 soft pink tissue fragments that measure in aggregate 0.5 x 0.3 cm.  The specimen is entirely submitted in cassette EDP--1-A    Irineo CHENG ([facility])    Disclaimer Unless the case is a 'gross only' or additional testing only, the final diagnosis for each specimen is based on a microscopic examination of appropriate tissue sections.     Specimen to Pathology, Bone Marrow Aspiration/Biopsy - 03/28/2024      Component 11 d ago   Final Pathologic Diagnosis BONE MARROW, RIGHT ILIAC CREST (ASPIRATE SMEAR, TOUCH IMPRINT, CLOT SECTION, AND CORE BIOPSY):  -- PLASMA CELL NEOPLASM (50-60 % OF MARROW CELLULARITY).  -- HYPERCELLULAR MARROW (50%) WITH TRILINEAGE HEMATOPOIESIS.  -- NO MORPHOLOGIC EVIDENCE OF AMYLOID DEPOSITS.  -- ADEQUATE STORAGE IRON.  -- SEE COMMENT.   Comment: Interp By Darrian Garcia M.D., Signed on 04/02/2024 at 09:16   [facility]/clinic label MRN:  [id number]  Pathology label MRN:  [id number]    Two specimens are received in a container filled with formalin labeled &quot;clot + core&quot;.    Specimen 1:  The specimen consists of one fragment of red-brown hemorrhagic material measuring 3.8 x 1.5 x 1.3 cm. The specimen is serially sectioned and submitted entirely in cassettes JAX--2-A, XNS--2-B, WGK--2-C, ZWN--2-D, and    KTE--2-E.    Specimen 2:  The specimen consists of one tan-yellow core of bone with red-brown hemorrhagic material attached measuring 1.8 x 0.8 x 0.7 cm in aggregate. The specimen is placed in Immunocal and submitted entirely in cassette JMP--3-A.    Martínez Kearney,  Gross Technologist   Microscopic Exam CBC (03/28/2024): WBC 7.67 K/uL (granulocyte 49.4 %, lymphocyte 39.1 %, monocyte I 0.2 %,  eosinophil 5.6 %, basophil 0.4 %, immature granulocytes 0.3%), RBC 4.42 M/uL, HGB 12.2 g/dL, HCT 38.6 %, MCV 87 fL, MCH 27.6 pg, MCHC 31.6 g/dL, RDW 15.0 %,  Platelet 238 K/uL, MPV 11.0 fL.    BONE MARROW ASPIRATE DIFFERENTIAL:  Blasts-----------------------------------------------0.0 %  Promyelocytes-----------------------------------1.0 %  Myelocytes----------------------------------------3.0 %  Metamyelocytes---------------------------------9.0 %  Bands-----------------------------------------------8.3 %  PMN Neutrophils--------------------------------26.3 %  Eosinophils---------------------------------------3.0 %  Basophils------------------------------------------0.0 %  Monocytes----------------------------------------1.7 %  Lymphocytes-------------------------------------10.0 %  Plasma cells--------------------------------------15.7 %  Erythroid precursors---------------------------22.0 %    BONE MARROW ASPIRATE SMEAR:  The smears contain cellular spicules.  The stain quality is suboptimal.  The myeloid to erythroid ratio is approximately 2.4:1.0. Both myeloid and erythroid precursors display orderly maturation. Megakaryocytes are present and morphologically  unremarkable.  Plasma cells comprise approximately 15.7% of the total nucleated cells.  Stainable iron is present. Ring sideroblasts are not identified.    BONE MARROW TOUCH IMPRINT:  The touch imprint displays cellular composition similar to the aspirate smear.    BONE MARROW CLOT SECTION:  The clot sections contain spicules with cellular composition similar to the biopsy core. Stainable iron is present.    BONE MARROW CORE BIOPSY:  Cortical and medullary bones are present. The cellularity is approximately 50%. The myeloid to erythroid ratio is unremarkable. Megakaryocytes are present and morphologically unremarkable.  Plasma cells are increased and form clusters.  Lymphoid  aggregates or granulomas are not identified.  Stainable iron is not identified.   Congo red stain fails to detect amyloid deposits.  Control is adequate.   Comment Flow cytometric analysis of bone marrow detects a lambda restricted plasma cell population that expresses , CD38, and CD45 (very dim to negative).  CD56, CD19 and CD20 are negative.  The sites are polytypic.  T lymphocytes are immunophenotypically  unremarkable.  The blast gate is not increased. Flow differential:  Lymphocytes 35.3%, Monocytes 1.7%, Granulocytes  36.9%, Blast  2.6%, Debris/nRBC 4.0%,  Viability 98.6%.    Immunohistochemical stains are performed on the biopsy core and clot section for greater sensitivity and further architectural assessment with adequate controls.  -positive plasma cells comprise approximately 50-60% of the total cellularity.    Correlation with other lab results is required.   Disclaimer Unless the case is a 'gross only' or additional testing only, the final diagnosis for each specimen is based on a microscopic examination of appropriate tissue sections.          Imaging   CT Chest Without Contrast - 02/08/2024   Impression:  1. Abnormal appearance of the esophagus with concern for mass lesion at the level of the roslyn.  Further evaluation recommended.  2. No detrimental change in bilateral lung nodules.  See description above.  This report was flagged in Epic as abnormal.     NM PET CT FDG Whole Body - 03/05/2024  Impression:  1. No abnormal FDG avidity in the skeleton.  No focal suspicious osseous lesion.  2. The polypoid intraluminal esophageal mass at the level of the roslyn shows no FDG avidity above background.  3. No abnormal FDG avidity on this exam.  All CT scans at this facility are performed  using dose modulation techniques as appropriate to performed exam including the following:  automated exposure control; adjustment of mA and/or kV according to the patients size (this includes techniques or standardized protocols for targeted exams where dose is matched to indication/reason  for exam: i.e. extremities or head);  iterative reconstruction technique.    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   No MDEs  Will ask one of the myeloma specialist to review  Recommend monitoring with q3-4m labs      Cancer Screening  Colonoscopy: Due  PSA 01/26/24: 0.74       Chronic Medical Conditions  HTN  COPD  Pulmonary Hypertension  HFpEF  History of Prostate Cancer  Lung Nodules   Gout        Med Onc Chart Routing      Follow up with physician . 5 weeks   Follow up with [facility]    Infusion scheduling note    Injection scheduling note    Labs CBC, CMP, free light chains, UPEP, SPEP, beta 2 microglobulin and LDH   Scheduling:  Preferred lab:  Lab interval:  Labs/urine studies in 4 weeks; please link all labs ordered by Dr. Coates   Imaging    Pharmacy appointment    Other referrals                      The patient was seen, interviewed and examined. Pertinent lab and radiologic studies were reviewed. Pt instructed to call should they develop concerning signs/symptoms or have further questions.        Portions of the record may have been created with voice recognition software. Occasional wrong-word or sound-a-like substitutions may have occurred due to the inherent limitations of voice recognition software. Read the chart carefully and recognize, using context, where substitutions have  occurred.      Marcy Coates MD    Hematology/Oncology

## 2024-04-09 LAB
CHROM BANDING METHOD: NORMAL
CHROMOSOME ANALYSIS BM ADDITIONAL INFORMATION: NORMAL
CHROMOSOME ANALYSIS BM RELEASED BY: NORMAL
CHROMOSOME ANALYSIS BM RESULT SUMMARY: NORMAL
CLINICAL CYTOGENETICIST REVIEW: NORMAL
KARYOTYP MAR: NORMAL
REASON FOR REFERRAL (NARRATIVE): NORMAL
REF LAB TEST METHOD: NORMAL
SPECIMEN SOURCE: NORMAL
SPECIMEN: NORMAL

## 2024-04-30 LAB
MYELOMA FISH REASON FOR REFERRAL (BM): NORMAL
MYELOMA FISH RESULT (BM): NORMAL
MYELOMA, FISH DISCLAIMER: NORMAL
MYELOMA, FISH INTERPRETATION: NORMAL
MYELOMA, FISH RELEASED BY: NORMAL
MYELOMA, FISH RESULT SUMMARY: NORMAL
MYELOMA, FISH RESULT TABLE: NORMAL
MYELOMA, FISH SPECIMEN: NORMAL
REF LAB TEST METHOD: NORMAL
SERVICE CMNT-IMP: NORMAL
SPECIMEN SOURCE: NORMAL

## 2024-05-22 PROBLEM — I70.0 AORTIC ATHEROSCLEROSIS: Status: ACTIVE | Noted: 2024-05-22

## 2024-06-05 ENCOUNTER — TELEPHONE (OUTPATIENT)
Dept: INTERNAL MEDICINE | Facility: CLINIC | Age: 69
End: 2024-06-05
Payer: MEDICARE

## 2024-06-05 VITALS — DIASTOLIC BLOOD PRESSURE: 66 MMHG | SYSTOLIC BLOOD PRESSURE: 135 MMHG

## 2024-09-10 DIAGNOSIS — M25.571 PAIN IN JOINT OF RIGHT FOOT: ICD-10-CM

## 2024-09-10 DIAGNOSIS — M10.9 ACUTE GOUT OF RIGHT FOOT, UNSPECIFIED CAUSE: ICD-10-CM

## 2024-09-23 RX ORDER — COLCHICINE 0.6 MG/1
0.6 TABLET ORAL DAILY
Qty: 6 TABLET | Refills: 0 | Status: SHIPPED | OUTPATIENT
Start: 2024-09-23 | End: 2024-09-29

## 2025-02-03 ENCOUNTER — ON-DEMAND VIRTUAL (OUTPATIENT)
Dept: URGENT CARE | Facility: CLINIC | Age: 70
End: 2025-02-03
Payer: MEDICARE

## 2025-02-03 DIAGNOSIS — M25.571 PAIN IN JOINT OF RIGHT FOOT: ICD-10-CM

## 2025-02-03 DIAGNOSIS — M10.9 ACUTE GOUT OF RIGHT ANKLE, UNSPECIFIED CAUSE: Primary | ICD-10-CM

## 2025-02-03 DIAGNOSIS — M10.9 ACUTE GOUT OF RIGHT FOOT, UNSPECIFIED CAUSE: ICD-10-CM

## 2025-02-03 PROCEDURE — 98005 SYNCH AUDIO-VIDEO EST LOW 20: CPT | Mod: 95,,, | Performed by: NURSE PRACTITIONER

## 2025-02-03 RX ORDER — COLCHICINE 0.6 MG/1
TABLET ORAL
Qty: 30 TABLET | Refills: 0 | Status: SHIPPED | OUTPATIENT
Start: 2025-02-03

## 2025-02-03 NOTE — PROGRESS NOTES
Subjective:      Patient ID: Musa Campos is a 69 y.o. male.    Vitals:  vitals were not taken for this visit.     Chief Complaint: Gout (Right ankle gout flare)      Visit Type: TELE AUDIOVISUAL    Past Medical History:   Diagnosis Date    Abnormality of plasma protein 02/04/2023    Hyperuricemia 02/03/2024    Monoclonal gammopathy 02/03/2024    Primary hypertension 01/25/2023     Past Surgical History:   Procedure Laterality Date    CATARACT EXTRACTION Left 07/26/2023    ESOPHAGOGASTRODUODENOSCOPY N/A 3/6/2024    Procedure: EGD (ESOPHAGOGASTRODUODENOSCOPY);  Surgeon: Marcie Mcduffie MD;  Location: [facility];  Service: Gastroenterology;  Laterality: N/A;    PROSTATE SURGERY       Review of patient's allergies indicates:  No Known Allergies  Current Outpatient Medications on File Prior to Visit   Medication Sig Dispense Refill    albuterol (PROVENTIL/VENTOLIN HFA) 90 mcg/actuation inhaler Inhale 2 puffs into the lungs every 6 (six) hours as needed for Wheezing. Rescue 18 g 11    allopurinoL (ZYLOPRIM) 100 MG tablet Take 1 tablet (100 mg total) by mouth once daily. 30 tablet 1    atorvastatin (LIPITOR) 80 MG tablet Take 1 tablet (80 mg total) by mouth once daily. 90 tablet 3    carvediloL (COREG) 12.5 MG tablet Take 1 tablet (12.5 mg total) by mouth 2 (two) times daily with meals. 60 tablet 11    fluticasone-salmeterol diskus inhaler 250-50 mcg Inhale 1 puff into the lungs 2 (two) times daily. Controller 180 each 3    gatifloxacin 0.5 % Drop drops Place 1 drop into the left eye 2 (two) times daily. Eyedrops to start one day before surgery 5 mL 2    ketorolac 0.5% (ACULAR) 0.5 % Drop Place 1 drop into the left eye 4 (four) times daily. Eyedrops to start one day before surgery 5 mL 2    methylPREDNISolone (MEDROL DOSEPACK) 4 mg tablet Take 1 tablet (4 mg total) by mouth once daily. Use as instructed on dose pack 21 tablet 0    naproxen (NAPROSYN) 500 MG tablet Take 1 tablet (500 mg total) by mouth 2 (two)  times daily as needed (for pain in joints). 30 tablet 1    omega-3 fatty acids-fish oil 340-1,000 mg Cap Take 1 capsule by mouth once daily. 90 capsule 3    prednisoLONE acetate (PRED FORTE) 1 % DrpS Place 1 drop into the left eye 4 (four) times daily. Start one day before surgery 5 mL 2    valsartan-hydrochlorothiazide (DIOVAN-HCT) 320-25 mg per tablet Take 1 tablet by mouth once daily. 90 tablet 1     No current facility-administered medications on file prior to visit.     Family History   Problem Relation Name Age of Onset    No Known Problems Mother      No Known Problems Father         Medications Ordered                [facility] [address] [place], [place] [address] [facility]   [address] [facility] [address]    Telephone: [phone]   Fax: [phone]   Hours: Not open 24 hours                         E-Prescribed (1 of 1)              colchicine (COLCRYS) 0.6 mg tablet    Sig: Take 2 tablets by mouth and then take one tablet 1 hour later.       Start: 2/3/25     Quantity: 30 tablet Refills: 0                           Ohs Peq Odvv Intake    2/3/2025  4:40 PM CST - Filed by Patient   What is your current physical address in the event of a medical emergency?    Are you able to take your vital signs? No   Please attach any relevant images or files    Is your employer contracted with [facility]? No         HPI     Gout     Additional comments: Right ankle gout flare likely precipitated by eating shrimp. Began 2 days ago.  Two patient identifiers were used-name was repeated verbally as well as date of birth.  The patient was located in their home in the [facility].          Musculoskeletal:  Positive for joint pain, joint swelling and gout.        Objective:   The physical exam was conducted virtually.  Physical Exam   Constitutional: He is oriented to person, place, and time. No distress.   HENT:   Head: Normocephalic and atraumatic.   Neck: Neck supple.   Pulmonary/Chest: Effort  normal. No respiratory distress.   Abdominal: Normal appearance.   Musculoskeletal: Normal range of motion.         General: Swelling (right lateral ankle) present. Normal range of motion.   Neurological: no focal deficit. He is alert and oriented to person, place, and time.   Skin: Skin is not pale.   Psychiatric: His behavior is normal. Mood, judgment and thought content normal.       Assessment:     1. Acute gout of right ankle, unspecified cause        Plan:       Acute gout of right ankle, unspecified cause    Other orders  -     colchicine (COLCRYS) 0.6 mg tablet; Take 2 tablets by mouth and then take one tablet 1 hour later.  Dispense: 30 tablet; Refill: 0    Meds as directed above.  F/u with PCP.    You must understand that you've received a virtual Care treatment only and that you may be released before all your medical problems are known or treated. You, the patient, will arrange for follow up care as instructed.  If your condition worsens we recommend that you receive another evaluation at an urgent care in person, the emergency room or contact your primary medical clinics after hours call service to discuss your concerns.              Present with the patient at the time of consultation: TELEMED PRESENT WITH PATIENT: family member

## 2025-02-10 ENCOUNTER — OFFICE VISIT (OUTPATIENT)
Dept: INTERNAL MEDICINE | Facility: CLINIC | Age: 70
End: 2025-02-10
Payer: MEDICARE

## 2025-02-10 DIAGNOSIS — D47.2 SMOLDERING MYELOMA: Chronic | ICD-10-CM

## 2025-02-10 DIAGNOSIS — I70.0 AORTIC ATHEROSCLEROSIS: Chronic | ICD-10-CM

## 2025-02-10 DIAGNOSIS — J41.0 SIMPLE CHRONIC BRONCHITIS: Chronic | ICD-10-CM

## 2025-02-10 DIAGNOSIS — R91.8 LUNG NODULE, MULTIPLE: ICD-10-CM

## 2025-02-10 DIAGNOSIS — I27.20 PULMONARY HYPERTENSION: Chronic | ICD-10-CM

## 2025-02-10 DIAGNOSIS — I50.32 CHRONIC HEART FAILURE WITH PRESERVED EJECTION FRACTION: Chronic | ICD-10-CM

## 2025-02-10 DIAGNOSIS — E78.2 MIXED HYPERLIPIDEMIA: Chronic | ICD-10-CM

## 2025-02-10 DIAGNOSIS — Z91.89 AT RISK FOR KNOWLEDGE DEFICIT: ICD-10-CM

## 2025-02-10 DIAGNOSIS — M1A.0710 IDIOPATHIC CHRONIC GOUT OF RIGHT ANKLE WITHOUT TOPHUS: Primary | Chronic | ICD-10-CM

## 2025-02-10 DIAGNOSIS — I85.10 SECONDARY ESOPHAGEAL VARICES WITHOUT BLEEDING: Chronic | ICD-10-CM

## 2025-02-10 DIAGNOSIS — Z85.46 HISTORY OF PROSTATE CANCER: Chronic | ICD-10-CM

## 2025-02-10 DIAGNOSIS — Z74.8 DEPENDENT FOR TRANSPORTATION: ICD-10-CM

## 2025-02-10 DIAGNOSIS — Z91.89 AT RISK FOR MEDICATION NONADHERENCE: ICD-10-CM

## 2025-02-10 PROBLEM — I85.00 ESOPHAGEAL VARICES WITHOUT BLEEDING: Chronic | Status: ACTIVE | Noted: 2024-03-13

## 2025-02-10 PROCEDURE — 1160F RVW MEDS BY RX/DR IN RCRD: CPT | Mod: CPTII,95,, | Performed by: FAMILY MEDICINE

## 2025-02-10 PROCEDURE — 1159F MED LIST DOCD IN RCRD: CPT | Mod: CPTII,95,, | Performed by: FAMILY MEDICINE

## 2025-02-10 PROCEDURE — G2211 COMPLEX E/M VISIT ADD ON: HCPCS | Mod: 95,,, | Performed by: FAMILY MEDICINE

## 2025-02-10 PROCEDURE — 98007 SYNCH AUDIO-VIDEO EST HI 40: CPT | Mod: 95,,, | Performed by: FAMILY MEDICINE

## 2025-02-10 RX ORDER — PREDNISONE 10 MG/1
TABLET ORAL
Qty: 20 TABLET | Refills: 0 | Status: SHIPPED | OUTPATIENT
Start: 2025-02-10 | End: 2025-02-18

## 2025-02-10 NOTE — ASSESSMENT & PLAN NOTE
Lab Results   Component Value Date    URICACID 8.1 (H) 02/20/2024    URICACID 8.8 (H) 01/26/2024    EGFRNORACEVR >60 03/28/2024    CREATININE 1.0 03/28/2024    BUN 18 03/28/2024

## 2025-02-13 ENCOUNTER — PATIENT OUTREACH (OUTPATIENT)
Dept: ADMINISTRATIVE | Facility: OTHER | Age: 70
End: 2025-02-13
Payer: MEDICARE

## 2025-02-13 RX ORDER — COLCHICINE 0.6 MG/1
0.6 TABLET ORAL DAILY
Qty: 6 TABLET | Refills: 0 | Status: SHIPPED | OUTPATIENT
Start: 2025-02-13 | End: 2025-02-19

## 2025-02-13 NOTE — PROGRESS NOTES
CHW - Initial Contact    This LPN completed the Social Determinant of Health questionnaire with caregiver via telephone today.    Pt identified barriers of most importance are: Transportation, food   Referrals to community agencies completed with patient/caregiver consent outside of [facility] include:  No  Referrals were put through [facility] - no:   Support and Services: Completed SDOH questionnaire, mailed resources, Sent In Basket message to PCP informing him that SDOH needs were addressed.   Other information discussed the patient needs / wants help with: None   Follow up required: Yes  Follow-up Outreach - Due: 2/20/2025        LPN spoke to patient/caregiver as per [facility] referral for: Transportation, Health Literacy    Does the patient consent to completing the assessment/enrollment: Yes  Does the patient consent for LPN to speak to a caregiver? Yes, describe: Patient gave verbal permission to speak with daughter, Marychuy Shirley.    Health Insurance Coverage:     Does the patient have adequate health insurance coverage? Yes  Education provided: No    PCP Follow-Up Appointments:    Does the patient have a primary care provider? yes - Dr. KONSTANTIN Krishna  Date of last PCP appointment?  2/10/25  Next PCP appointment:  None  Was patient provided with education surrounding PCP services/creating a medical home? yes - Educated on the benefits of having a PCP.  Educated on the use of urgent care clinic for non emergent issues when PCP unavailable. Marychuy verbalized understanding.      Specialist Appointments:     Does the patient have a pending specialist referral? no  Does the patient have an upcoming specialist appointment? no  Is the patient pregnant? N/A  Does the patient have a mental health provider? no       Home Medications:     Reviewed medication list with patient? No  Is the patient able to afford their medications? Yes  Marychuy states al medications are $0.00 except the inhaler.    Care Gaps:     Does the patient  have any care gaps that are due? Yes    Care Gaps     Overdue          Never done TETANUS VACCINE (Every 10 Years)     Never done Shingles Vaccine (1 of 2)     Never done Colorectal Cancer Screening (View topic details)     Never done RSV Vaccine (Age 60+ and Pregnant patients) (1 - Risk 60-74 years 1-dose series)     MAY 28  2021 COVID-19 Vaccine (3 - Pfizer risk series)  Last completed: Apr 30, 2021     SEP 1  2024 Influenza Vaccine (1)  Last completed: Mar 29, 2023     BECKY 26  2025 PROSTATE-SPECIFIC ANTIGEN (Yearly)   Last ordered: Feb 10, 2025     Recent lab results:  Blood Sugar: N/A   Provided education: No  Blood Pressure: Did not check yet   Provided education: Yes Instructed to continue following a low salt diet and taking medications as prescribed.  Marychuy verbalized understanding.        Social Determinants of Health (SDOH)    Patient's identified areas of need:     Transportation, food   Education/Resources provided:    Yes      Home Health/DME:    Current Home Health: Yes, describe: [facility] Marychuy states they are scheduled to visit 2/14/25 0930  Patient has all healthcare equipment/supplies indicated: yes  Current DME: standard walker and crutches, BP cuff    Additional Documentation:   Identity verified.  Patient gave verbal permission to speak with daughter, Marychuy Shirley.  Informed Marychuy that [employer] provides 36 on way trips to medical appointments.  Instructed to contact the number on the back of the card to schedule transportation.  Provided phone number, [phone].  Marychuy states that they have food insecurity.  She states the patient gets $23.00 [employer].  Offered to provide food bank resources, Marychuy accepted.  Marychuy states the patient has no shortness of breath.        Follow up:   Patient agrees to scheduled follow up call. Yes

## 2025-02-13 NOTE — PATIENT INSTRUCTIONS
[employer]    [facility] [employer] [facility]  [phone]    [facility] [employer]  [phone]    [employer]  [phone]    [facility]  [phone]    [facility]  [phone]    [facility]  [phone]    [employer] [place]   [phone]    [facility]  [phone]    [facility]  [phone]    [facility]  [phone]    [place] [employer]   [address] [place] [address]  [phone]    [place]  [address] [place] [address]  [phone]    [employer]  [phone] option 1    Transportation    [employer] provided 36 one way trips  [phone]

## 2025-02-20 ENCOUNTER — PATIENT OUTREACH (OUTPATIENT)
Dept: ADMINISTRATIVE | Facility: OTHER | Age: 70
End: 2025-02-20
Payer: MEDICARE

## 2025-02-21 ENCOUNTER — TELEPHONE (OUTPATIENT)
Dept: HOME HEALTH SERVICES | Facility: CLINIC | Age: 70
End: 2025-02-21
Payer: MEDICARE

## 2025-02-21 ENCOUNTER — PATIENT MESSAGE (OUTPATIENT)
Dept: HOME HEALTH SERVICES | Facility: CLINIC | Age: 70
End: 2025-02-21
Payer: MEDICARE

## 2025-02-25 ENCOUNTER — LAB VISIT (OUTPATIENT)
Dept: LAB | Facility: HOSPITAL | Age: 70
End: 2025-02-25
Attending: INTERNAL MEDICINE
Payer: MEDICARE

## 2025-02-25 DIAGNOSIS — C90.00 MYELOMA ASSOCIATED AMYLOIDOSIS: ICD-10-CM

## 2025-02-25 DIAGNOSIS — Z91.89 PERSONAL HISTORY OF POISONING, PRESENTING HAZARDS TO HEALTH: ICD-10-CM

## 2025-02-25 DIAGNOSIS — D47.2 SMOLDERING MYELOMA: ICD-10-CM

## 2025-02-25 DIAGNOSIS — Z85.46 PERSONAL HISTORY OF MALIGNANT NEOPLASM OF PROSTATE: ICD-10-CM

## 2025-02-25 DIAGNOSIS — J44.9 VANISHING LUNG: ICD-10-CM

## 2025-02-25 DIAGNOSIS — K76.6 PORTOPULMONARY HYPERTENSION: ICD-10-CM

## 2025-02-25 DIAGNOSIS — E78.2 MIXED HYPERLIPIDEMIA: ICD-10-CM

## 2025-02-25 DIAGNOSIS — D47.2 MONOCLONAL PARAPROTEINEMIA: Primary | ICD-10-CM

## 2025-02-25 DIAGNOSIS — I70.0 ATHEROSCLEROSIS OF AORTA: ICD-10-CM

## 2025-02-25 DIAGNOSIS — I27.20 PORTOPULMONARY HYPERTENSION: ICD-10-CM

## 2025-02-25 DIAGNOSIS — I50.30 HEART FAILURE, DIASTOLIC: ICD-10-CM

## 2025-02-25 DIAGNOSIS — Z85.46 HISTORY OF PROSTATE CANCER: Chronic | ICD-10-CM

## 2025-02-25 DIAGNOSIS — E85.9 MYELOMA ASSOCIATED AMYLOIDOSIS: ICD-10-CM

## 2025-02-25 LAB
ALBUMIN SERPL BCP-MCNC: 2.8 G/DL (ref 3.5–5.2)
ALBUMIN SERPL BCP-MCNC: 2.8 G/DL (ref 3.5–5.2)
ALP SERPL-CCNC: 80 U/L (ref 40–150)
ALP SERPL-CCNC: 80 U/L (ref 40–150)
ALT SERPL W/O P-5'-P-CCNC: 17 U/L (ref 10–44)
ALT SERPL W/O P-5'-P-CCNC: 17 U/L (ref 10–44)
ANION GAP SERPL CALC-SCNC: 9 MMOL/L (ref 8–16)
ANION GAP SERPL CALC-SCNC: 9 MMOL/L (ref 8–16)
AST SERPL-CCNC: 18 U/L (ref 10–40)
AST SERPL-CCNC: 18 U/L (ref 10–40)
BASOPHILS # BLD AUTO: 0.04 K/UL (ref 0–0.2)
BASOPHILS NFR BLD: 0.6 % (ref 0–1.9)
BILIRUB SERPL-MCNC: 0.4 MG/DL (ref 0.1–1)
BILIRUB SERPL-MCNC: 0.4 MG/DL (ref 0.1–1)
BUN SERPL-MCNC: 14 MG/DL (ref 8–23)
BUN SERPL-MCNC: 14 MG/DL (ref 8–23)
CALCIUM SERPL-MCNC: 9.3 MG/DL (ref 8.7–10.5)
CALCIUM SERPL-MCNC: 9.3 MG/DL (ref 8.7–10.5)
CHLORIDE SERPL-SCNC: 103 MMOL/L (ref 95–110)
CHLORIDE SERPL-SCNC: 103 MMOL/L (ref 95–110)
CO2 SERPL-SCNC: 27 MMOL/L (ref 23–29)
CO2 SERPL-SCNC: 27 MMOL/L (ref 23–29)
COMPLEXED PSA SERPL-MCNC: 0.78 NG/ML (ref 0–4)
CREAT SERPL-MCNC: 1 MG/DL (ref 0.5–1.4)
CREAT SERPL-MCNC: 1 MG/DL (ref 0.5–1.4)
CRP SERPL-MCNC: 23.8 MG/L (ref 0–8.2)
DIFFERENTIAL METHOD BLD: ABNORMAL
EOSINOPHIL # BLD AUTO: 0.4 K/UL (ref 0–0.5)
EOSINOPHIL NFR BLD: 5.7 % (ref 0–8)
ERYTHROCYTE [DISTWIDTH] IN BLOOD BY AUTOMATED COUNT: 15.1 % (ref 11.5–14.5)
ERYTHROCYTE [SEDIMENTATION RATE] IN BLOOD BY PHOTOMETRIC METHOD: 120 MM/HR (ref 0–23)
EST. GFR  (NO RACE VARIABLE): >60 ML/MIN/1.73 M^2
EST. GFR  (NO RACE VARIABLE): >60 ML/MIN/1.73 M^2
GLUCOSE SERPL-MCNC: 99 MG/DL (ref 70–110)
GLUCOSE SERPL-MCNC: 99 MG/DL (ref 70–110)
HCT VFR BLD AUTO: 38.5 % (ref 40–54)
HGB BLD-MCNC: 11.9 G/DL (ref 14–18)
IGA SERPL-MCNC: 50 MG/DL (ref 40–350)
IGA SERPL-MCNC: 50 MG/DL (ref 40–350)
IGG SERPL-MCNC: 4986 MG/DL (ref 650–1600)
IGG SERPL-MCNC: 4986 MG/DL (ref 650–1600)
IGM SERPL-MCNC: 17 MG/DL (ref 50–300)
IGM SERPL-MCNC: 17 MG/DL (ref 50–300)
IMM GRANULOCYTES # BLD AUTO: 0.03 K/UL (ref 0–0.04)
IMM GRANULOCYTES NFR BLD AUTO: 0.4 % (ref 0–0.5)
LDH SERPL L TO P-CCNC: 129 U/L (ref 110–260)
LDH SERPL L TO P-CCNC: 129 U/L (ref 110–260)
LYMPHOCYTES # BLD AUTO: 3.2 K/UL (ref 1–4.8)
LYMPHOCYTES NFR BLD: 44.1 % (ref 18–48)
MCH RBC QN AUTO: 28 PG (ref 27–31)
MCHC RBC AUTO-ENTMCNC: 30.9 G/DL (ref 32–36)
MCV RBC AUTO: 91 FL (ref 82–98)
MONOCYTES # BLD AUTO: 0.4 K/UL (ref 0.3–1)
MONOCYTES NFR BLD: 5.9 % (ref 4–15)
NEUTROPHILS # BLD AUTO: 3.1 K/UL (ref 1.8–7.7)
NEUTROPHILS NFR BLD: 43.3 % (ref 38–73)
NRBC BLD-RTO: 0 /100 WBC
PLATELET # BLD AUTO: 213 K/UL (ref 150–450)
PMV BLD AUTO: 12.1 FL (ref 9.2–12.9)
POTASSIUM SERPL-SCNC: 4.7 MMOL/L (ref 3.5–5.1)
POTASSIUM SERPL-SCNC: 4.7 MMOL/L (ref 3.5–5.1)
PROT SERPL-MCNC: 9.5 G/DL (ref 6–8.4)
PROT SERPL-MCNC: 9.5 G/DL (ref 6–8.4)
RBC # BLD AUTO: 4.25 M/UL (ref 4.6–6.2)
SODIUM SERPL-SCNC: 139 MMOL/L (ref 136–145)
SODIUM SERPL-SCNC: 139 MMOL/L (ref 136–145)
URATE SERPL-MCNC: 8.7 MG/DL (ref 3.4–7)
WBC # BLD AUTO: 7.25 K/UL (ref 3.9–12.7)

## 2025-02-25 PROCEDURE — 36415 COLL VENOUS BLD VENIPUNCTURE: CPT | Mod: PN | Performed by: INTERNAL MEDICINE

## 2025-02-25 PROCEDURE — 84550 ASSAY OF BLOOD/URIC ACID: CPT | Performed by: INTERNAL MEDICINE

## 2025-02-25 PROCEDURE — 83615 LACTATE (LD) (LDH) ENZYME: CPT | Performed by: INTERNAL MEDICINE

## 2025-02-25 PROCEDURE — 85025 COMPLETE CBC W/AUTO DIFF WBC: CPT | Performed by: INTERNAL MEDICINE

## 2025-02-25 PROCEDURE — 82232 ASSAY OF BETA-2 PROTEIN: CPT | Performed by: INTERNAL MEDICINE

## 2025-02-25 PROCEDURE — 82784 ASSAY IGA/IGD/IGG/IGM EACH: CPT | Mod: 59 | Performed by: INTERNAL MEDICINE

## 2025-02-25 PROCEDURE — 86334 IMMUNOFIX E-PHORESIS SERUM: CPT | Performed by: INTERNAL MEDICINE

## 2025-02-25 PROCEDURE — 84165 PROTEIN E-PHORESIS SERUM: CPT | Performed by: INTERNAL MEDICINE

## 2025-02-25 PROCEDURE — 84153 ASSAY OF PSA TOTAL: CPT | Performed by: FAMILY MEDICINE

## 2025-02-25 PROCEDURE — 84165 PROTEIN E-PHORESIS SERUM: CPT | Mod: 26,,, | Performed by: PATHOLOGY

## 2025-02-25 PROCEDURE — 80053 COMPREHEN METABOLIC PANEL: CPT | Performed by: INTERNAL MEDICINE

## 2025-02-25 PROCEDURE — 85652 RBC SED RATE AUTOMATED: CPT | Performed by: INTERNAL MEDICINE

## 2025-02-25 PROCEDURE — 86334 IMMUNOFIX E-PHORESIS SERUM: CPT | Mod: 26,,, | Performed by: PATHOLOGY

## 2025-02-25 PROCEDURE — 83521 IG LIGHT CHAINS FREE EACH: CPT | Performed by: INTERNAL MEDICINE

## 2025-02-25 PROCEDURE — 86140 C-REACTIVE PROTEIN: CPT | Performed by: INTERNAL MEDICINE

## 2025-02-26 ENCOUNTER — TELEPHONE (OUTPATIENT)
Dept: HOME HEALTH SERVICES | Facility: CLINIC | Age: 70
End: 2025-02-26
Payer: MEDICARE

## 2025-02-26 LAB
ALBUMIN SERPL ELPH-MCNC: 3.43 G/DL (ref 3.35–5.55)
ALPHA1 GLOB SERPL ELPH-MCNC: 0.34 G/DL (ref 0.17–0.41)
ALPHA2 GLOB SERPL ELPH-MCNC: 0.84 G/DL (ref 0.43–0.99)
B-GLOBULIN SERPL ELPH-MCNC: 0.72 G/DL (ref 0.5–1.1)
GAMMA GLOB SERPL ELPH-MCNC: 4.07 G/DL (ref 0.67–1.58)
INTERPRETATION SERPL IFE-IMP: NORMAL
KAPPA LC SER QL IA: 2.19 MG/DL (ref 0.33–1.94)
KAPPA LC/LAMBDA SER IA: 0.18 (ref 0.26–1.65)
LAMBDA LC SER QL IA: 12.17 MG/DL (ref 0.57–2.63)
PROT SERPL-MCNC: 9.4 G/DL (ref 6–8.4)

## 2025-02-27 ENCOUNTER — TELEPHONE (OUTPATIENT)
Dept: HOME HEALTH SERVICES | Facility: CLINIC | Age: 70
End: 2025-02-27
Payer: MEDICARE

## 2025-02-27 ENCOUNTER — PATIENT OUTREACH (OUTPATIENT)
Dept: ADMINISTRATIVE | Facility: OTHER | Age: 70
End: 2025-02-27
Payer: MEDICARE

## 2025-02-27 LAB
B2 MICROGLOB SERPL-MCNC: 4.5 UG/ML (ref 0–2.5)
PATHOLOGIST INTERPRETATION IFE: NORMAL
PATHOLOGIST INTERPRETATION SPE: NORMAL

## 2025-02-27 NOTE — PROGRESS NOTES
CHW - Follow Up    This LPN completed a follow up visit with caregiver via telephone today.  Pt/Caregiver reported: Identity verified.  Marychuy states patient is doing well and has no SOB.  She states she has received the resources mailed to the patient, but she has not contacted them.  Instructed to contact the [email or web address] for their distribution times and what is required to apply.  BP did not check.  Marychuy asked about free CPR classes.  Will research and provide resources.  Marychuy verbalized understanding to all information and instructions and will return call when she gets home for the CR class information.  LPN provided: researched CPR classes  Follow up required: Yes  Follow-up Outreach - Due: 3/6/2025

## 2025-03-10 ENCOUNTER — PATIENT OUTREACH (OUTPATIENT)
Dept: ADMINISTRATIVE | Facility: OTHER | Age: 70
End: 2025-03-10
Payer: MEDICARE

## 2025-03-18 ENCOUNTER — PATIENT OUTREACH (OUTPATIENT)
Dept: ADMINISTRATIVE | Facility: OTHER | Age: 70
End: 2025-03-18
Payer: MEDICARE

## 2025-04-02 ENCOUNTER — PATIENT OUTREACH (OUTPATIENT)
Dept: ADMINISTRATIVE | Facility: OTHER | Age: 70
End: 2025-04-02
Payer: MEDICARE

## 2025-04-16 ENCOUNTER — EXTERNAL HOME HEALTH (OUTPATIENT)
Dept: HOME HEALTH SERVICES | Facility: HOSPITAL | Age: 70
End: 2025-04-16
Payer: MEDICARE

## 2025-06-27 ENCOUNTER — PATIENT OUTREACH (OUTPATIENT)
Dept: ADMINISTRATIVE | Facility: HOSPITAL | Age: 70
End: 2025-06-27
Payer: MEDICARE

## 2025-06-27 DIAGNOSIS — Z12.11 SCREENING FOR COLORECTAL CANCER: Primary | ICD-10-CM

## 2025-06-27 DIAGNOSIS — Z12.12 SCREENING FOR COLORECTAL CANCER: Primary | ICD-10-CM

## 2025-06-27 NOTE — PROGRESS NOTES
[facility] OUTREACH: per chart review pt is overdue for Cscope, spoke to pt daughter offered Cologuard/Cscope, pt daughter requested scheduling for Cscope, Endo referral placed.

## 2025-07-02 ENCOUNTER — HOSPITAL ENCOUNTER (OUTPATIENT)
Dept: PREADMISSION TESTING | Facility: HOSPITAL | Age: 70
Discharge: HOME | End: 2025-07-02
Attending: FAMILY MEDICINE
Payer: MEDICARE

## 2025-07-02 DIAGNOSIS — Z12.12 SCREENING FOR COLORECTAL CANCER: ICD-10-CM

## 2025-07-02 DIAGNOSIS — Z12.11 SCREENING FOR COLORECTAL CANCER: ICD-10-CM

## 2025-08-29 ENCOUNTER — PATIENT MESSAGE (OUTPATIENT)
Dept: ADMINISTRATIVE | Facility: HOSPITAL | Age: 70
End: 2025-08-29
Payer: MEDICARE